# Patient Record
Sex: FEMALE | Race: WHITE | NOT HISPANIC OR LATINO | URBAN - METROPOLITAN AREA
[De-identification: names, ages, dates, MRNs, and addresses within clinical notes are randomized per-mention and may not be internally consistent; named-entity substitution may affect disease eponyms.]

---

## 2017-11-20 ENCOUNTER — EMERGENCY (EMERGENCY)
Facility: HOSPITAL | Age: 82
LOS: 0 days | Discharge: HOME | End: 2017-11-20

## 2017-11-20 DIAGNOSIS — R55 SYNCOPE AND COLLAPSE: ICD-10-CM
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
## 2017-11-20 DIAGNOSIS — M62.82 RHABDOMYOLYSIS: ICD-10-CM

## 2017-11-20 DIAGNOSIS — S82.409A UNSPECIFIED FRACTURE OF SHAFT OF UNSPECIFIED FIBULA, INITIAL ENCOUNTER FOR CLOSED FRACTURE: ICD-10-CM
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
## 2017-11-20 DIAGNOSIS — E86.0 DEHYDRATION: ICD-10-CM
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
## 2017-11-20 DIAGNOSIS — Z88.0 ALLERGY STATUS TO PENICILLIN: ICD-10-CM

## 2019-01-17 ENCOUNTER — INPATIENT (INPATIENT)
Facility: HOSPITAL | Age: 84
LOS: 4 days | Discharge: NURSING, REHAB OR LONG-TERM CARE | End: 2019-01-22
Attending: INTERNAL MEDICINE | Admitting: INTERNAL MEDICINE
Payer: MEDICARE

## 2019-01-17 VITALS
DIASTOLIC BLOOD PRESSURE: 87 MMHG | TEMPERATURE: 96 F | RESPIRATION RATE: 18 BRPM | OXYGEN SATURATION: 96 % | HEART RATE: 115 BPM | SYSTOLIC BLOOD PRESSURE: 141 MMHG

## 2019-01-17 DIAGNOSIS — Z98.890 OTHER SPECIFIED POSTPROCEDURAL STATES: Chronic | ICD-10-CM

## 2019-01-17 LAB
ALBUMIN SERPL ELPH-MCNC: 4.4 G/DL — SIGNIFICANT CHANGE UP (ref 3.5–5.2)
ALP SERPL-CCNC: 33 U/L — SIGNIFICANT CHANGE UP (ref 30–115)
ALT FLD-CCNC: 24 U/L — SIGNIFICANT CHANGE UP (ref 0–41)
ANION GAP SERPL CALC-SCNC: 14 MMOL/L — SIGNIFICANT CHANGE UP (ref 7–14)
APPEARANCE UR: ABNORMAL
APTT BLD: 23.6 SEC — CRITICAL LOW (ref 27–39.2)
AST SERPL-CCNC: 46 U/L — HIGH (ref 0–41)
BACTERIA # UR AUTO: SIGNIFICANT CHANGE UP /HPF
BASOPHILS # BLD AUTO: 0.02 K/UL — SIGNIFICANT CHANGE UP (ref 0–0.2)
BASOPHILS NFR BLD AUTO: 0.2 % — SIGNIFICANT CHANGE UP (ref 0–1)
BILIRUB SERPL-MCNC: 0.8 MG/DL — SIGNIFICANT CHANGE UP (ref 0.2–1.2)
BILIRUB UR-MCNC: ABNORMAL
BUN SERPL-MCNC: 59 MG/DL — HIGH (ref 10–20)
CALCIUM SERPL-MCNC: 10.4 MG/DL — HIGH (ref 8.5–10.1)
CHLORIDE SERPL-SCNC: 111 MMOL/L — HIGH (ref 98–110)
CO2 SERPL-SCNC: 29 MMOL/L — SIGNIFICANT CHANGE UP (ref 17–32)
COLOR SPEC: YELLOW — SIGNIFICANT CHANGE UP
COMMENT - URINE: SIGNIFICANT CHANGE UP
CREAT SERPL-MCNC: 1.1 MG/DL — SIGNIFICANT CHANGE UP (ref 0.7–1.5)
DIFF PNL FLD: ABNORMAL
EOSINOPHIL # BLD AUTO: 0 K/UL — SIGNIFICANT CHANGE UP (ref 0–0.7)
EOSINOPHIL NFR BLD AUTO: 0 % — SIGNIFICANT CHANGE UP (ref 0–8)
EPI CELLS # UR: ABNORMAL /HPF
GLUCOSE SERPL-MCNC: 162 MG/DL — HIGH (ref 70–99)
GLUCOSE UR QL: NEGATIVE MG/DL — SIGNIFICANT CHANGE UP
HCT VFR BLD CALC: 47 % — SIGNIFICANT CHANGE UP (ref 37–47)
HGB BLD-MCNC: 15.7 G/DL — SIGNIFICANT CHANGE UP (ref 12–16)
IMM GRANULOCYTES NFR BLD AUTO: 0.7 % — HIGH (ref 0.1–0.3)
INR BLD: 1.01 RATIO — SIGNIFICANT CHANGE UP (ref 0.65–1.3)
KETONES UR-MCNC: NEGATIVE — SIGNIFICANT CHANGE UP
LEUKOCYTE ESTERASE UR-ACNC: ABNORMAL
LYMPHOCYTES # BLD AUTO: 0.91 K/UL — LOW (ref 1.2–3.4)
LYMPHOCYTES # BLD AUTO: 6.9 % — LOW (ref 20.5–51.1)
MAGNESIUM SERPL-MCNC: 2.3 MG/DL — SIGNIFICANT CHANGE UP (ref 1.8–2.4)
MCHC RBC-ENTMCNC: 27.5 PG — SIGNIFICANT CHANGE UP (ref 27–31)
MCHC RBC-ENTMCNC: 33.4 G/DL — SIGNIFICANT CHANGE UP (ref 32–37)
MCV RBC AUTO: 82.5 FL — SIGNIFICANT CHANGE UP (ref 81–99)
MONOCYTES # BLD AUTO: 0.94 K/UL — HIGH (ref 0.1–0.6)
MONOCYTES NFR BLD AUTO: 7.1 % — SIGNIFICANT CHANGE UP (ref 1.7–9.3)
NEUTROPHILS # BLD AUTO: 11.32 K/UL — HIGH (ref 1.4–6.5)
NEUTROPHILS NFR BLD AUTO: 85.1 % — HIGH (ref 42.2–75.2)
NITRITE UR-MCNC: NEGATIVE — SIGNIFICANT CHANGE UP
NRBC # BLD: 0 /100 WBCS — SIGNIFICANT CHANGE UP (ref 0–0)
PH UR: 5.5 — SIGNIFICANT CHANGE UP (ref 5–8)
PLATELET # BLD AUTO: 284 K/UL — SIGNIFICANT CHANGE UP (ref 130–400)
POTASSIUM SERPL-MCNC: 4.2 MMOL/L — SIGNIFICANT CHANGE UP (ref 3.5–5)
POTASSIUM SERPL-SCNC: 4.2 MMOL/L — SIGNIFICANT CHANGE UP (ref 3.5–5)
PROT SERPL-MCNC: 7.6 G/DL — SIGNIFICANT CHANGE UP (ref 6–8)
PROT UR-MCNC: 30 MG/DL
PROTHROM AB SERPL-ACNC: 10.9 SEC — SIGNIFICANT CHANGE UP (ref 9.95–12.87)
RBC # BLD: 5.7 M/UL — HIGH (ref 4.2–5.4)
RBC # FLD: 13.4 % — SIGNIFICANT CHANGE UP (ref 11.5–14.5)
RBC CASTS # UR COMP ASSIST: SIGNIFICANT CHANGE UP /HPF
SODIUM SERPL-SCNC: 154 MMOL/L — HIGH (ref 135–146)
SP GR SPEC: 1.02 — SIGNIFICANT CHANGE UP (ref 1.01–1.03)
TROPONIN T SERPL-MCNC: <0.01 NG/ML — SIGNIFICANT CHANGE UP
UROBILINOGEN FLD QL: 1 MG/DL (ref 0.2–0.2)
WBC # BLD: 13.28 K/UL — HIGH (ref 4.8–10.8)
WBC # FLD AUTO: 13.28 K/UL — HIGH (ref 4.8–10.8)
WBC UR QL: ABNORMAL /HPF

## 2019-01-17 RX ORDER — INFLUENZA VIRUS VACCINE 15; 15; 15; 15 UG/.5ML; UG/.5ML; UG/.5ML; UG/.5ML
0.5 SUSPENSION INTRAMUSCULAR ONCE
Qty: 0 | Refills: 0 | Status: COMPLETED | OUTPATIENT
Start: 2019-01-17 | End: 2019-01-17

## 2019-01-17 RX ORDER — CHLORHEXIDINE GLUCONATE 213 G/1000ML
1 SOLUTION TOPICAL
Qty: 0 | Refills: 0 | Status: DISCONTINUED | OUTPATIENT
Start: 2019-01-17 | End: 2019-01-22

## 2019-01-17 RX ORDER — SODIUM CHLORIDE 9 MG/ML
1000 INJECTION, SOLUTION INTRAVENOUS
Qty: 0 | Refills: 0 | Status: DISCONTINUED | OUTPATIENT
Start: 2019-01-17 | End: 2019-01-19

## 2019-01-17 RX ORDER — SODIUM CHLORIDE 9 MG/ML
1000 INJECTION INTRAMUSCULAR; INTRAVENOUS; SUBCUTANEOUS ONCE
Qty: 0 | Refills: 0 | Status: COMPLETED | OUTPATIENT
Start: 2019-01-17 | End: 2019-01-17

## 2019-01-17 RX ORDER — HEPARIN SODIUM 5000 [USP'U]/ML
5000 INJECTION INTRAVENOUS; SUBCUTANEOUS EVERY 12 HOURS
Qty: 0 | Refills: 0 | Status: DISCONTINUED | OUTPATIENT
Start: 2019-01-17 | End: 2019-01-22

## 2019-01-17 RX ADMIN — SODIUM CHLORIDE 75 MILLILITER(S): 9 INJECTION, SOLUTION INTRAVENOUS at 22:03

## 2019-01-17 RX ADMIN — SODIUM CHLORIDE 33.33 MILLILITER(S): 9 INJECTION INTRAMUSCULAR; INTRAVENOUS; SUBCUTANEOUS at 17:00

## 2019-01-17 NOTE — ED PROVIDER NOTE - CARE PLAN
Principal Discharge DX:	Hypernatremia  Secondary Diagnosis:	Dizziness  Secondary Diagnosis:	Dehydration  Secondary Diagnosis:	Fall  Secondary Diagnosis:	Poor hygiene

## 2019-01-17 NOTE — H&P ADULT - NSHPPHYSICALEXAM_GEN_ALL_CORE
VITALS:  T(F): 96.5 (01-17-19 @ 17:06), Max: 96.5 (01-17-19 @ 17:06)  HR: 118 (01-17-19 @ 17:06) (115 - 118)  BP: 122/80 (01-17-19 @ 17:06) (122/80 - 141/87)  RR: 20 (01-17-19 @ 17:06) (18 - 20)  SpO2: 95% (01-17-19 @ 17:06) (95% - 96%)    PHYSICAL EXAM:  GENERAL: NAD, disheveled.  HEAD:  Atraumatic, Normocephalic  EYES: conjunctiva and sclera clear  ENMT: Moist mucous membranes  NECK: Supple, Normal thyroid  NERVOUS SYSTEM:  Alert & Oriented X 3, Good concentration; Motor Strength 5/5 B/L upper and lower extremities  CHEST/LUNG: Clear to auscultation bilaterally; No rales, rhonchi, wheezing, or rubs  HEART: Regular rate and rhythm; No murmurs, rubs, or gallops  ABDOMEN: Soft, Nontender, Nondistended; Bowel sounds present  EXTREMITIES:  2+ Peripheral Pulses, No clubbing, cyanosis, or edema  LYMPH: No lymphadenopathy noted  SKIN: No rashes or lesions

## 2019-01-17 NOTE — ED PROVIDER NOTE - MEDICAL DECISION MAKING DETAILS
BUN elevated with elevated Na.  Pt will need  for further evaluation of living situation. Case d/w Dr Rodriguez, accepts admisison.  Pt aware of plan

## 2019-01-17 NOTE — H&P ADULT - NSHPLABSRESULTS_GEN_ALL_CORE
15.7   13.28 )-----------( 284      ( 2019 14:40 )             47.0             154<H>  |  111<H>  |  59<H>  ----------------------------<  162<H>  4.2   |  29  |  1.1    Ca    10.4<H>      2019 14:40  Mg     2.3         TPro  7.6  /  Alb  4.4  /  TBili  0.8  /  DBili  x   /  AST  46<H>  /  ALT  24  /  AlkPhos  33          CT Head No Cont (19 @ 14:59)   1.  Prominent ventricles out of proportion to sulcal pattern in the   appropriate clinical setting consistent with communicating hydrocephalus.     2.  Periventricular and subcortical white matter chronic small vessel   ischemic changes.    3.  No acute mass effect, midline shift or hemorrhage.     4.  Slightly limited study due to patient motion and motion artifact.     5.  If the patient continues to be symptomatic follow-up MRI of the brain   may be helpful for further evaluation.        PT/INR - ( 2019 14:40 )   PT: 10.90 sec;   INR: 1.01 ratio    PTT - ( 2019 14:40 )  PTT:23.6 sec      Urinalysis Basic - ( 2019 14:40 )    Color: Yellow / Appearance: Turbid / S.025 / pH: x  Gluc: x / Ketone: Negative  / Bili: Moderate / Urobili: 1.0 mg/dL   Blood: x / Protein: 30 mg/dL / Nitrite: Negative   Leuk Esterase: Small / RBC: 1-2 /HPF / WBC 6-10 /HPF   Sq Epi: x / Non Sq Epi: Few /HPF / Bacteria: Occasional /HPF

## 2019-01-17 NOTE — ED PROVIDER NOTE - ATTENDING CONTRIBUTION TO CARE
84 yo F no significant PMHx (does not like to go to the Dr) presents from home with c/o feeling dizzy.  She fell today and called 911.  no CP, no SOB.  Pt lives alone.  Tony Dillard called after patient arrived ([phone]) to say that patient does not care for herself, does not go to the doctors and can be very stubborn.  On exam pt is AAo x 3, disheveled, wearing clothes that are too big, covered in stool.  thin, no obvious signs of trauma, dry MM, Op clear, Lungs CAT B/L no wrr, abd soft nt nd, , no edema

## 2019-01-17 NOTE — ED ADULT NURSE NOTE - NSIMPLEMENTINTERV_GEN_ALL_ED
Implemented All Universal Safety Interventions:  [place] to call system. Call bell, personal items and telephone within reach. Instruct patient to call for assistance. Room bathroom lighting operational. Non-slip footwear when patient is off stretcher. Physically safe environment: no spills, clutter or unnecessary equipment. Stretcher in lowest position, wheels locked, appropriate side rails in place.

## 2019-01-17 NOTE — ED PROVIDER NOTE - OBJECTIVE STATEMENT
82 y/o female presents with dizziness which began today. patient s/p fall but denies any headache, back pain or visual changes. patient denies any chest pain, abdominal pain , SOB. patient lives alone and is unable to preform ADLs including dressing, showering herself. patient presetns to the ed covered in fecal matter, and urine stained clothing.

## 2019-01-17 NOTE — H&P ADULT - HISTORY OF PRESENT ILLNESS
84 y/o female with no known past medical history admitted with complaints of dizziness. Patient mentions that she lives alone and has been in her usual state of health until yesterday when she started feeling dizzy and sustaining a fall. She remained down as she could not get up until she was able to call EMS by activating her life saver. She denied any LOC head trauma, chest pain, palpations, headache, focal weakness or systemic symptoms. Work up in the ED showed mild leukocytosis with hypernatremia but no injury. She is unable to preform ADLs including dressing, showering herself. On arrival she was covered in fecal matter and urine stained clothing.

## 2019-01-17 NOTE — ED ADULT NURSE NOTE - CHIEF COMPLAINT QUOTE
pt brought in via [facility], pt fell at home and hit her life alert, pt states she tripped

## 2019-01-17 NOTE — H&P ADULT - ASSESSMENT
84 y/o female with no known past medical history admitted with complaints of dizziness. Patient mentions that she lives alone and has been in her usual state of health until yesterday when she started feeling dizzy and sustaining a fall. She remained down as she could not get up until she was able to call EMS by activating her life saver. She denied any LOC head trauma, chest pain, palpations, headache, focal weakness or systemic symptoms. Work up in the ED showed mild leukocytosis with hypernatremia but no injury. She is unable to preform ADLs including dressing, showering herself. On arrival she was covered in fecal matter and urine stained clothing.      Assessment and Plan:    1. s/p Mechanical fall:  PT evaluation.  Check CPK level.      2. Debility:  Social work consult.  Will need Placement.      3. Hypernatremia:  Encourage PO intake.  IV fluids.      4. Hypercalcemia & Hypernatremia:  IV 0.45 NSS at 75 cc.  Monitor Levels.      5. Asymptomatic Pyuria:  No need to treat.  Monitor.      DVT prophylaxis: Heparin.

## 2019-01-18 LAB
ALBUMIN SERPL ELPH-MCNC: 3.3 G/DL — LOW (ref 3.5–5.2)
ALP SERPL-CCNC: 29 U/L — LOW (ref 30–115)
ALT FLD-CCNC: 20 U/L — SIGNIFICANT CHANGE UP (ref 0–41)
ANION GAP SERPL CALC-SCNC: 10 MMOL/L — SIGNIFICANT CHANGE UP (ref 7–14)
ANION GAP SERPL CALC-SCNC: 16 MMOL/L — HIGH (ref 7–14)
AST SERPL-CCNC: 39 U/L — SIGNIFICANT CHANGE UP (ref 0–41)
BASOPHILS # BLD AUTO: 0.03 K/UL — SIGNIFICANT CHANGE UP (ref 0–0.2)
BASOPHILS NFR BLD AUTO: 0.3 % — SIGNIFICANT CHANGE UP (ref 0–1)
BILIRUB SERPL-MCNC: 0.7 MG/DL — SIGNIFICANT CHANGE UP (ref 0.2–1.2)
BUN SERPL-MCNC: 50 MG/DL — HIGH (ref 10–20)
BUN SERPL-MCNC: 51 MG/DL — HIGH (ref 10–20)
CALCIUM SERPL-MCNC: 9 MG/DL — SIGNIFICANT CHANGE UP (ref 8.5–10.1)
CALCIUM SERPL-MCNC: 9.5 MG/DL — SIGNIFICANT CHANGE UP (ref 8.5–10.1)
CHLORIDE SERPL-SCNC: 104 MMOL/L — SIGNIFICANT CHANGE UP (ref 98–110)
CHLORIDE SERPL-SCNC: 110 MMOL/L — SIGNIFICANT CHANGE UP (ref 98–110)
CK SERPL-CCNC: 205 U/L — SIGNIFICANT CHANGE UP (ref 0–225)
CO2 SERPL-SCNC: 28 MMOL/L — SIGNIFICANT CHANGE UP (ref 17–32)
CO2 SERPL-SCNC: 28 MMOL/L — SIGNIFICANT CHANGE UP (ref 17–32)
CREAT SERPL-MCNC: 1.3 MG/DL — SIGNIFICANT CHANGE UP (ref 0.7–1.5)
CREAT SERPL-MCNC: 1.3 MG/DL — SIGNIFICANT CHANGE UP (ref 0.7–1.5)
EOSINOPHIL # BLD AUTO: 0.1 K/UL — SIGNIFICANT CHANGE UP (ref 0–0.7)
EOSINOPHIL NFR BLD AUTO: 0.9 % — SIGNIFICANT CHANGE UP (ref 0–8)
FOLATE SERPL-MCNC: 5.8 NG/ML — SIGNIFICANT CHANGE UP
GLUCOSE SERPL-MCNC: 109 MG/DL — HIGH (ref 70–99)
GLUCOSE SERPL-MCNC: 83 MG/DL — SIGNIFICANT CHANGE UP (ref 70–99)
HCT VFR BLD CALC: 37.4 % — SIGNIFICANT CHANGE UP (ref 37–47)
HGB BLD-MCNC: 12.8 G/DL — SIGNIFICANT CHANGE UP (ref 12–16)
IMM GRANULOCYTES NFR BLD AUTO: 0.9 % — HIGH (ref 0.1–0.3)
LYMPHOCYTES # BLD AUTO: 1.46 K/UL — SIGNIFICANT CHANGE UP (ref 1.2–3.4)
LYMPHOCYTES # BLD AUTO: 12.6 % — LOW (ref 20.5–51.1)
MAGNESIUM SERPL-MCNC: 1.9 MG/DL — SIGNIFICANT CHANGE UP (ref 1.8–2.4)
MCHC RBC-ENTMCNC: 28.3 PG — SIGNIFICANT CHANGE UP (ref 27–31)
MCHC RBC-ENTMCNC: 34.2 G/DL — SIGNIFICANT CHANGE UP (ref 32–37)
MCV RBC AUTO: 82.6 FL — SIGNIFICANT CHANGE UP (ref 81–99)
MONOCYTES # BLD AUTO: 1.04 K/UL — HIGH (ref 0.1–0.6)
MONOCYTES NFR BLD AUTO: 8.9 % — SIGNIFICANT CHANGE UP (ref 1.7–9.3)
NEUTROPHILS # BLD AUTO: 8.9 K/UL — HIGH (ref 1.4–6.5)
NEUTROPHILS NFR BLD AUTO: 76.4 % — HIGH (ref 42.2–75.2)
NRBC # BLD: 0 /100 WBCS — SIGNIFICANT CHANGE UP (ref 0–0)
PHOSPHATE SERPL-MCNC: 2.5 MG/DL — SIGNIFICANT CHANGE UP (ref 2.1–4.9)
PLATELET # BLD AUTO: 207 K/UL — SIGNIFICANT CHANGE UP (ref 130–400)
POTASSIUM SERPL-MCNC: 3.6 MMOL/L — SIGNIFICANT CHANGE UP (ref 3.5–5)
POTASSIUM SERPL-MCNC: 3.9 MMOL/L — SIGNIFICANT CHANGE UP (ref 3.5–5)
POTASSIUM SERPL-SCNC: 3.6 MMOL/L — SIGNIFICANT CHANGE UP (ref 3.5–5)
POTASSIUM SERPL-SCNC: 3.9 MMOL/L — SIGNIFICANT CHANGE UP (ref 3.5–5)
PROT SERPL-MCNC: 5.6 G/DL — LOW (ref 6–8)
RBC # BLD: 4.53 M/UL — SIGNIFICANT CHANGE UP (ref 4.2–5.4)
RBC # FLD: 13.4 % — SIGNIFICANT CHANGE UP (ref 11.5–14.5)
SODIUM SERPL-SCNC: 148 MMOL/L — HIGH (ref 135–146)
SODIUM SERPL-SCNC: 148 MMOL/L — HIGH (ref 135–146)
TSH SERPL-MCNC: 1.42 UIU/ML — SIGNIFICANT CHANGE UP (ref 0.27–4.2)
VIT B12 SERPL-MCNC: 788 PG/ML — SIGNIFICANT CHANGE UP (ref 232–1245)
WBC # BLD: 11.63 K/UL — HIGH (ref 4.8–10.8)
WBC # FLD AUTO: 11.63 K/UL — HIGH (ref 4.8–10.8)

## 2019-01-18 PROCEDURE — 99221 1ST HOSP IP/OBS SF/LOW 40: CPT

## 2019-01-18 RX ORDER — ACETAMINOPHEN 500 MG
650 TABLET ORAL EVERY 6 HOURS
Qty: 0 | Refills: 0 | Status: DISCONTINUED | OUTPATIENT
Start: 2019-01-18 | End: 2019-01-22

## 2019-01-18 RX ADMIN — CHLORHEXIDINE GLUCONATE 1 APPLICATION(S): 213 SOLUTION TOPICAL at 05:12

## 2019-01-18 RX ADMIN — HEPARIN SODIUM 5000 UNIT(S): 5000 INJECTION INTRAVENOUS; SUBCUTANEOUS at 18:08

## 2019-01-18 RX ADMIN — HEPARIN SODIUM 5000 UNIT(S): 5000 INJECTION INTRAVENOUS; SUBCUTANEOUS at 05:12

## 2019-01-18 NOTE — CONSULT NOTE ADULT - SUBJECTIVE AND OBJECTIVE BOX
S :   83y year old Female seen at bedside for diabetic risk foot assessment with a chief complaint of   painful thickened, dystrophic, ingrowing  and long toenails digits 1-5 bilaterally  and preventative foot examination. Patient is medically managed  by Medicine/Hospitalists.  Patient denies any history o f trauma to both feet.  Patient has no other pedal complaints.  Patient is experiencing pain while standing, walking and in shoe gear.       Patient admits to  (-) Fevers, (-) Chills, (-) Nausea, (-) Vomiting, (-) Shortness of Breath (-) calf pain (-) chest pain       PMH: No pertinent past medical history    PSH:H/O eye surgery      Allergies:No Known Allergies      Labs:                        12.8   11.63 )-----------( 207      ( 18 Jan 2019 08:48 )             37.4       WBC Trend  11.63<H> Date (01-18 @ 08:48)  13.28<H> Date (01-17 @ 14:40)      Chem  01-18    148<H>  |  110  |  50<H>  ----------------------------<  109<H>  3.9   |  28  |  1.3    Ca    9.0      18 Jan 2019 08:48  Phos  2.5     01-18  Mg     1.9     01-18    TPro  5.6<L>  /  Alb  3.3<L>  /  TBili  0.7  /  DBili  x   /  AST  39  /  ALT  20  /  AlkPhos  29<L>  01-18          T(F): 96.2 (01-18-19 @ 14:08), Max: 96.8 (01-17-19 @ 20:35)  HR: 111 (01-18-19 @ 14:08) (100 - 119)  BP: 95/58 (01-18-19 @ 14:08) (95/58 - 110/55)  RR: 16 (01-18-19 @ 14:08) (16 - 16)  SpO2: 97% (01-17-19 @ 21:50) (97% - 97%)  Wt(kg): --    O:   Integument:  Skin warm, dry and supple bilateral.  No open lesions or inter-digital macerations noted bilateral.   Toenails 1-5 Right and Left feet thickened, elongated, discolored, and dystrophic with subungual debris. There is pain upon palpation of all fungal and ingrowing nails 1-5 bilaterally.   Vascular: Dorsalis Pedis and Posterior Tibial pulses diminished .  Capillary re-fill time less than 3 seconds digits 1-5 bilateral.   Neuro: Protective sensation intact to the level of the digits bilateral.  MSK: Muscle strength 5/5 all major muscle groups bilateral. No structural abnormality, bilaterally      A:   1. bilateral hypertrohic Onychomycosis digits 1-5   2. Pain from Elongated nails, ingrowing  and dystrophic nails  P: Discussed diagnosis and treatment with patient  Aseptic debridement and curretage  of all fungal and ingrowing  nails 1-5 bilateral with sterile nail pack  Please re-consult as needed.

## 2019-01-18 NOTE — CONSULT NOTE ADULT - ATTENDING COMMENTS
agree with note  Patient would benefit from periodic debridement and foot care   Patient can follow up in clinic as out patient

## 2019-01-18 NOTE — PHYSICAL THERAPY INITIAL EVALUATION ADULT - GENERAL OBSERVATIONS, REHAB EVAL
10:35 - 10:58. Chart reviewed. Patient available to be seen for physical therapy, confirmed with nurse. Patient encountered semi-reclined in bed, +IV.  Patient denies pain at rest, would like to get OOB to chair.

## 2019-01-18 NOTE — CONSULT NOTE ADULT - SUBJECTIVE AND OBJECTIVE BOX
HPI:  82 y/o female with no known past medical history admitted with complaints of dizziness. Patient mentions that she lives alone and has been in her usual state of health until yesterday when she started feeling dizzy and sustaining a fall. She remained down as she could not get up until she was able to call EMS by activating her life saver. She denied any LOC head trauma, chest pain, palpations, headache, focal weakness or systemic symptoms. Work up in the ED showed mild leukocytosis with hypernatremia but no injury. She is unable to preform ADLs including dressing, showering herself. On arrival she was covered in fecal matter and urine stained clothing. (2019 18:43)    PTN  REFERRED TO ACUTE  REHAB  FOR  EVAL AND  TX   PAST MEDICAL & SURGICAL HISTORY:  No pertinent past medical history  H/O eye surgery      Hospital Course:    TODAY'S SUBJECTIVE & REVIEW OF SYMPTOMS:     Constitutional WNL   Cardio WNL   Resp WNL   GI WNL  Heme WNL  Endo WNL  Skin WNL  MSK WNL  Neuro WNL  Cognitive WNL  Psych WNL      MEDICATIONS  (STANDING):  chlorhexidine 4% Liquid 1 Application(s) Topical <User Schedule>  heparin  Injectable 5000 Unit(s) SubCutaneous every 12 hours  sodium chloride 0.45%. 1000 milliLiter(s) (75 mL/Hr) IV Continuous <Continuous>    MEDICATIONS  (PRN):      FAMILY HISTORY:  Family history of heart disease (Sibling)      Allergies    No Known Allergies    Intolerances        SOCIAL HISTORY:    [  ] Etoh  [  ] Smoking  [  ] Substance abuse     Home Environment:  [ x ] Home Alone  [  ] Lives with Family  [  ] Home Health Aid    Dwelling:  [  ] Apartment  [ x ] Private House  [  ] Adult Home  [  ] Skilled Nursing Facility      [  ] Short Term  [  ] Long Term  [  x] Stairs       Elevator [  ]    FUNCTIONAL STATUS PTA: (Check all that apply)  Ambulation: [ x  ]Independent    [  ] Dependent     [  ] Non-Ambulatory  Assistive Device: [  ] SA Cane  [  ]  Q Cane  [  ] Walker  [  ]  Wheelchair  ADL : [x  ] Independent  [  ]  Dependent       Vital Signs Last 24 Hrs  T(C): 35.5 (2019 05:10), Max: 36 (2019 20:35)  T(F): 95.9 (2019 05:10), Max: 96.8 (2019 20:35)  HR: 100 (2019 05:10) (100 - 119)  BP: 102/57 (2019 05:10) (102/57 - 141/87)  BP(mean): --  RR: 16 (2019 05:10) (16 - 20)  SpO2: 97% (2019 21:50) (95% - 97%)      PHYSICAL EXAM: Alert & Oriented X3  GENERAL: NAD, well-groomed, well-developed  HEAD:  Atraumatic, Normocephalic  EYES: EOMI, PERRLA, conjunctiva and sclera clear  NECK: Supple, No JVD, Normal thyroid  CHEST/LUNG: Clear to percussion bilaterally; No rales, rhonchi, wheezing, or rubs  HEART: Regular rate and rhythm; No murmurs, rubs, or gallops  ABDOMEN: Soft, Nontender, Nondistended; Bowel sounds present  EXTREMITIES:  2+ Peripheral Pulses, No clubbing, cyanosis, or edema    NERVOUS SYSTEM:  Cranial Nerves 2-12 intact [ x ] Abnormal  [  ]  ROM: WFL all extremities [  ]  Abnormal [ x ]  Motor Strength: WFL all extremities  [  ]  Abnormal [ x ]  Sensation: intact to light touch [  ] Abnormal [ x ]  Reflexes: Symmetric [  ]  Abnormal [x  ]    FUNCTIONAL STATUS:  Bed Mobility: Independent [  ]  Supervision [  ]  Needs Assistance [ x ]  N/A [  ]  Transfers: Independent [  ]  Supervision [  ]  Needs Assistance [  x]  N/A [  ]   Ambulation: Independent [  ]  Supervision [  ]  Needs Assistance [ x ]  N/A [  ]  ADL: Independent [x  ] Requires Assistance [  ] N/A [  ]  SEE PT/OT IE NOTES    LABS:                        12.8   11.63 )-----------( 207      ( 2019 08:48 )             37.4         148<H>  |  110  |  50<H>  ----------------------------<  109<H>  3.9   |  28  |  1.3    Ca    9.0      2019 08:48  Phos  2.5       Mg     1.9         TPro  5.6<L>  /  Alb  3.3<L>  /  TBili  0.7  /  DBili  x   /  AST  39  /  ALT  20  /  AlkPhos  29<L>      PT/INR - ( 2019 14:40 )   PT: 10.90 sec;   INR: 1.01 ratio         PTT - ( 2019 14:40 )  PTT:23.6 sec  Urinalysis Basic - ( 2019 14:40 )    Color: Yellow / Appearance: Turbid / S.025 / pH: x  Gluc: x / Ketone: Negative  / Bili: Moderate / Urobili: 1.0 mg/dL   Blood: x / Protein: 30 mg/dL / Nitrite: Negative   Leuk Esterase: Small / RBC: 1-2 /HPF / WBC 6-10 /HPF   Sq Epi: x / Non Sq Epi: Few /HPF / Bacteria: Occasional /HPF        RADIOLOGY & ADDITIONAL STUDIES:    Assesment:

## 2019-01-18 NOTE — PHYSICAL THERAPY INITIAL EVALUATION ADULT - IMPAIRMENTS FOUND, PT EVAL
muscle strength/aerobic capacity/endurance/posture/ergonomics and body mechanics/gait, locomotion, and balance

## 2019-01-18 NOTE — CONSULT NOTE ADULT - ASSESSMENT
IMPRESSION: Rehab of 82 y/o  f  rehab  for gd     PRECAUTIONS: [  ] Cardiac  [  ] Respiratory  [  ] Seizures [  ] Contact Isolation  [  ] Droplet Isolation  [ FALL ] Other    Weight Bearing Status:     RECOMMENDATION:    Out of Bed to Chair     DVT/Decubiti Prophylaxis    REHAB PLAN:     [ x  ] Bedside P/T 3-5 times a week   [   ]   Bedside O/T  2-3 times a week             [   ] No Rehab Therapy Indicated                   [   ]  Speech Therapy   Conditioning/ROM                                    ADL  Bed Mobility                                               Conditioning/ROM  Transfers                                                     Bed Mobility  Sitting /Standing Balance                         Transfers                                        Gait Training                                               Sitting/Standing Balance  Stair Training [   ]Applicable                    Home equipment Eval                                                                        Splinting  [   ] Only      GOALS:   ADL   [ x  ]   Independent                    Transfers  [  x] Independent                          Ambulation  [  x ] Independent     [    ] With device                            [   ]  CG                                                         [   ]  CG                                                                  [   ] CG                            [    ] Min A                                                   [   ] Min A                                                              [   ] Min  A          DISCHARGE PLAN:   [   ]  Good candidate for Intensive Rehabilitation/Hospital based-[facility]                                             Will tolerate 3hrs Intensive Rehab Daily                                       [ xx   ]  Short Term Rehab in Skilled Nursing Facility                                       [    ]  Home with Outpatient or  services                                         [    ]  Possible Candidate for Intensive Hospital based Rehab

## 2019-01-18 NOTE — PROGRESS NOTE ADULT - ASSESSMENT
84 y/o female with no known past medical history admitted with complaints of dizziness. Patient mentions that she lives alone and has been in her usual state of health until yesterday when she started feeling dizzy and sustaining a fall. She remained down as she could not get up until she was able to call EMS by activating her life saver. She denied any LOC head trauma, chest pain, palpations, headache, focal weakness or systemic symptoms. Work up in the ED showed mild leukocytosis with hypernatremia but no injury. She is unable to preform ADLs including dressing, showering herself. On arrival she was covered in fecal matter and urine stained clothing.      Assessment and Plan:    1. s/p Mechanical fall:  PT evaluation: STR in SNF.  CPK level pending.       2. Debility:  Social work consult.  Will need Placement.      3. Hypernatremia: Improved with Hydration.  Encourage PO intake.  Gentle IV fluids.      4. Hypercalcemia: Resolved  Monitor Levels.      5. Asymptomatic Pyuria:  No need to treat.  Monitor.      DVT prophylaxis: Heparin.  Disposition: SNF when stable.

## 2019-01-18 NOTE — PHYSICAL THERAPY INITIAL EVALUATION ADULT - GAIT DEVIATIONS NOTED, PT EVAL
decreased step length/narrow base of support/decreased weight-shifting ability/decreased meir/increased time in double stance

## 2019-01-19 DIAGNOSIS — E87.0 HYPEROSMOLALITY AND HYPERNATREMIA: ICD-10-CM

## 2019-01-19 DIAGNOSIS — R46.0 VERY LOW LEVEL OF PERSONAL HYGIENE: ICD-10-CM

## 2019-01-19 DIAGNOSIS — I95.9 HYPOTENSION, UNSPECIFIED: ICD-10-CM

## 2019-01-19 LAB
-  AMIKACIN: SIGNIFICANT CHANGE UP
-  AMOXICILLIN/CLAVULANIC ACID: SIGNIFICANT CHANGE UP
-  AMPICILLIN/SULBACTAM: SIGNIFICANT CHANGE UP
-  AMPICILLIN: SIGNIFICANT CHANGE UP
-  AZTREONAM: SIGNIFICANT CHANGE UP
-  CEFAZOLIN: SIGNIFICANT CHANGE UP
-  CEFEPIME: SIGNIFICANT CHANGE UP
-  CEFOXITIN: SIGNIFICANT CHANGE UP
-  CEFTRIAXONE: SIGNIFICANT CHANGE UP
-  CIPROFLOXACIN: SIGNIFICANT CHANGE UP
-  ERTAPENEM: SIGNIFICANT CHANGE UP
-  GENTAMICIN: SIGNIFICANT CHANGE UP
-  IMIPENEM: SIGNIFICANT CHANGE UP
-  LEVOFLOXACIN: SIGNIFICANT CHANGE UP
-  MEROPENEM: SIGNIFICANT CHANGE UP
-  NITROFURANTOIN: SIGNIFICANT CHANGE UP
-  PIPERACILLIN/TAZOBACTAM: SIGNIFICANT CHANGE UP
-  TIGECYCLINE: SIGNIFICANT CHANGE UP
-  TOBRAMYCIN: SIGNIFICANT CHANGE UP
-  TRIMETHOPRIM/SULFAMETHOXAZOLE: SIGNIFICANT CHANGE UP
ANION GAP SERPL CALC-SCNC: 7 MMOL/L — SIGNIFICANT CHANGE UP (ref 7–14)
BUN SERPL-MCNC: 39 MG/DL — HIGH (ref 10–20)
CALCIUM SERPL-MCNC: 8.3 MG/DL — LOW (ref 8.5–10.1)
CHLORIDE SERPL-SCNC: 105 MMOL/L — SIGNIFICANT CHANGE UP (ref 98–110)
CO2 SERPL-SCNC: 26 MMOL/L — SIGNIFICANT CHANGE UP (ref 17–32)
CREAT SERPL-MCNC: 1.1 MG/DL — SIGNIFICANT CHANGE UP (ref 0.7–1.5)
CULTURE RESULTS: SIGNIFICANT CHANGE UP
GLUCOSE SERPL-MCNC: 86 MG/DL — SIGNIFICANT CHANGE UP (ref 70–99)
METHOD TYPE: SIGNIFICANT CHANGE UP
ORGANISM # SPEC MICROSCOPIC CNT: SIGNIFICANT CHANGE UP
ORGANISM # SPEC MICROSCOPIC CNT: SIGNIFICANT CHANGE UP
POTASSIUM SERPL-MCNC: 3.9 MMOL/L — SIGNIFICANT CHANGE UP (ref 3.5–5)
POTASSIUM SERPL-SCNC: 3.9 MMOL/L — SIGNIFICANT CHANGE UP (ref 3.5–5)
SODIUM SERPL-SCNC: 138 MMOL/L — SIGNIFICANT CHANGE UP (ref 135–146)
SPECIMEN SOURCE: SIGNIFICANT CHANGE UP

## 2019-01-19 RX ORDER — SODIUM CHLORIDE 9 MG/ML
1000 INJECTION INTRAMUSCULAR; INTRAVENOUS; SUBCUTANEOUS
Qty: 0 | Refills: 0 | Status: DISCONTINUED | OUTPATIENT
Start: 2019-01-19 | End: 2019-01-20

## 2019-01-19 RX ORDER — CEFTRIAXONE 500 MG/1
INJECTION, POWDER, FOR SOLUTION INTRAMUSCULAR; INTRAVENOUS
Qty: 0 | Refills: 0 | Status: DISCONTINUED | OUTPATIENT
Start: 2019-01-19 | End: 2019-01-22

## 2019-01-19 RX ORDER — MIDODRINE HYDROCHLORIDE 2.5 MG/1
5 TABLET ORAL ONCE
Qty: 0 | Refills: 0 | Status: COMPLETED | OUTPATIENT
Start: 2019-01-19 | End: 2019-01-19

## 2019-01-19 RX ORDER — CEFTRIAXONE 500 MG/1
1 INJECTION, POWDER, FOR SOLUTION INTRAMUSCULAR; INTRAVENOUS ONCE
Qty: 0 | Refills: 0 | Status: COMPLETED | OUTPATIENT
Start: 2019-01-19 | End: 2019-01-19

## 2019-01-19 RX ORDER — SODIUM CHLORIDE 9 MG/ML
500 INJECTION INTRAMUSCULAR; INTRAVENOUS; SUBCUTANEOUS ONCE
Qty: 0 | Refills: 0 | Status: COMPLETED | OUTPATIENT
Start: 2019-01-19 | End: 2019-01-19

## 2019-01-19 RX ORDER — MIDODRINE HYDROCHLORIDE 2.5 MG/1
5 TABLET ORAL EVERY 8 HOURS
Qty: 0 | Refills: 0 | Status: DISCONTINUED | OUTPATIENT
Start: 2019-01-19 | End: 2019-01-22

## 2019-01-19 RX ORDER — CEFTRIAXONE 500 MG/1
1 INJECTION, POWDER, FOR SOLUTION INTRAMUSCULAR; INTRAVENOUS EVERY 24 HOURS
Qty: 0 | Refills: 0 | Status: DISCONTINUED | OUTPATIENT
Start: 2019-01-20 | End: 2019-01-22

## 2019-01-19 RX ADMIN — CEFTRIAXONE 100 GRAM(S): 500 INJECTION, POWDER, FOR SOLUTION INTRAMUSCULAR; INTRAVENOUS at 19:03

## 2019-01-19 RX ADMIN — SODIUM CHLORIDE 75 MILLILITER(S): 9 INJECTION INTRAMUSCULAR; INTRAVENOUS; SUBCUTANEOUS at 13:43

## 2019-01-19 RX ADMIN — SODIUM CHLORIDE 75 MILLILITER(S): 9 INJECTION INTRAMUSCULAR; INTRAVENOUS; SUBCUTANEOUS at 17:17

## 2019-01-19 RX ADMIN — SODIUM CHLORIDE 500 MILLILITER(S): 9 INJECTION INTRAMUSCULAR; INTRAVENOUS; SUBCUTANEOUS at 17:21

## 2019-01-19 RX ADMIN — HEPARIN SODIUM 5000 UNIT(S): 5000 INJECTION INTRAVENOUS; SUBCUTANEOUS at 17:23

## 2019-01-19 RX ADMIN — CHLORHEXIDINE GLUCONATE 1 APPLICATION(S): 213 SOLUTION TOPICAL at 05:20

## 2019-01-19 RX ADMIN — MIDODRINE HYDROCHLORIDE 5 MILLIGRAM(S): 2.5 TABLET ORAL at 21:25

## 2019-01-19 RX ADMIN — MIDODRINE HYDROCHLORIDE 5 MILLIGRAM(S): 2.5 TABLET ORAL at 13:44

## 2019-01-19 RX ADMIN — SODIUM CHLORIDE 75 MILLILITER(S): 9 INJECTION, SOLUTION INTRAVENOUS at 05:40

## 2019-01-19 RX ADMIN — MIDODRINE HYDROCHLORIDE 5 MILLIGRAM(S): 2.5 TABLET ORAL at 17:22

## 2019-01-19 RX ADMIN — SODIUM CHLORIDE 75 MILLILITER(S): 9 INJECTION, SOLUTION INTRAVENOUS at 11:24

## 2019-01-19 RX ADMIN — HEPARIN SODIUM 5000 UNIT(S): 5000 INJECTION INTRAVENOUS; SUBCUTANEOUS at 05:20

## 2019-01-19 NOTE — DIETITIAN INITIAL EVALUATION ADULT. - OTHER INFO
pt presents with dizziness s/p mechanical fall at home, ED work up showed mild leukocytosis with hypernatremia, no injurys noted. pt needs d/c planning to SNF/STR

## 2019-01-19 NOTE — PROGRESS NOTE ADULT - ASSESSMENT
84 y/o female with no known past medical history admitted with complaints of dizziness. Patient mentions that she lives alone and has been in her usual state of health until yesterday when she started feeling dizzy and sustaining a fall. She remained down as she could not get up until she was able to call EMS by activating her life saver. She denied any LOC head trauma, chest pain, palpations, headache, focal weakness or systemic symptoms. Work up in the ED showed mild leukocytosis with hypernatremia but no injury. She is unable to preform ADLs including dressing, showering herself. On arrival she was covered in fecal matter and urine stained clothing.      Assessment and Plan:    1. s/p Mechanical fall:  PT evaluation: STR in SNF.  CPK level wnl.      2. Debility:  Social work consult.  Will need Placement.      3. Hypernatremia: Resolved.  Encourage PO intake.        4. Hypercalcemia: Resolved  Monitor Levels.        5. Asymptomatic Pyuria:  No need to treat.  Monitor.      6. Hypotension:  Recheck BP for further management.        DVT prophylaxis: Heparin.  Disposition: SNF when stable. 82 y/o female with no known past medical history admitted with complaints of dizziness. Patient mentions that she lives alone and has been in her usual state of health until yesterday when she started feeling dizzy and sustaining a fall. She remained down as she could not get up until she was able to call EMS by activating her life saver. She denied any LOC head trauma, chest pain, palpations, headache, focal weakness or systemic symptoms. Work up in the ED showed mild leukocytosis with hypernatremia but no injury. She is unable to preform ADLs including dressing, showering herself. On arrival she was covered in fecal matter and urine stained clothing.      Assessment and Plan:    1. s/p Mechanical fall:  PT evaluation: STR in SNF.  CPK level wnl.      2. Debility:  Social work consult.  Will need Placement.      3. Hypernatremia: Resolved.  Encourage PO intake.        4. Hypercalcemia: Resolved  Monitor Levels.        5. E. Coli UTI:  Rocephin.      6. Hypotension: Asymptomatic.  IV fluids. Midodrine.        DVT prophylaxis: Heparin.  Disposition: SNF when stable.

## 2019-01-19 NOTE — PROGRESS NOTE ADULT - ASSESSMENT
From a geriatric perspective she needs urgent social service intervention - can go to SNF for RR but the question remains what will happen then?  I recommend contacting the niece re her willingness to supervise her aunt at home. A lot will depend upon the financial circumstances of the patient.  If she does go home after RR at SNF she would be an excellent candidate for [facility] Visit [facility] ([phone]) so that a medical team can follow her at home.    In the internum she needs to have her BP stabilized as well as further monitoring of serum Na.    Recommend she gets OOB to chair today.

## 2019-01-20 LAB
ANION GAP SERPL CALC-SCNC: 6 MMOL/L — LOW (ref 7–14)
BASOPHILS # BLD AUTO: 0.05 K/UL — SIGNIFICANT CHANGE UP (ref 0–0.2)
BASOPHILS NFR BLD AUTO: 0.5 % — SIGNIFICANT CHANGE UP (ref 0–1)
BUN SERPL-MCNC: 32 MG/DL — HIGH (ref 10–20)
CALCIUM SERPL-MCNC: 8.2 MG/DL — LOW (ref 8.5–10.1)
CHLORIDE SERPL-SCNC: 101 MMOL/L — SIGNIFICANT CHANGE UP (ref 98–110)
CO2 SERPL-SCNC: 25 MMOL/L — SIGNIFICANT CHANGE UP (ref 17–32)
CREAT SERPL-MCNC: 1.1 MG/DL — SIGNIFICANT CHANGE UP (ref 0.7–1.5)
EOSINOPHIL # BLD AUTO: 0.31 K/UL — SIGNIFICANT CHANGE UP (ref 0–0.7)
EOSINOPHIL NFR BLD AUTO: 3.1 % — SIGNIFICANT CHANGE UP (ref 0–8)
GLUCOSE SERPL-MCNC: 97 MG/DL — SIGNIFICANT CHANGE UP (ref 70–99)
HCT VFR BLD CALC: 30.2 % — LOW (ref 37–47)
HGB BLD-MCNC: 10.4 G/DL — LOW (ref 12–16)
IMM GRANULOCYTES NFR BLD AUTO: 3.8 % — HIGH (ref 0.1–0.3)
LYMPHOCYTES # BLD AUTO: 1.48 K/UL — SIGNIFICANT CHANGE UP (ref 1.2–3.4)
LYMPHOCYTES # BLD AUTO: 15 % — LOW (ref 20.5–51.1)
MCHC RBC-ENTMCNC: 28.3 PG — SIGNIFICANT CHANGE UP (ref 27–31)
MCHC RBC-ENTMCNC: 34.4 G/DL — SIGNIFICANT CHANGE UP (ref 32–37)
MCV RBC AUTO: 82.3 FL — SIGNIFICANT CHANGE UP (ref 81–99)
MONOCYTES # BLD AUTO: 1.08 K/UL — HIGH (ref 0.1–0.6)
MONOCYTES NFR BLD AUTO: 10.9 % — HIGH (ref 1.7–9.3)
NEUTROPHILS # BLD AUTO: 6.58 K/UL — HIGH (ref 1.4–6.5)
NEUTROPHILS NFR BLD AUTO: 66.7 % — SIGNIFICANT CHANGE UP (ref 42.2–75.2)
NRBC # BLD: 0 /100 WBCS — SIGNIFICANT CHANGE UP (ref 0–0)
PLATELET # BLD AUTO: 156 K/UL — SIGNIFICANT CHANGE UP (ref 130–400)
POTASSIUM SERPL-MCNC: 4.1 MMOL/L — SIGNIFICANT CHANGE UP (ref 3.5–5)
POTASSIUM SERPL-SCNC: 4.1 MMOL/L — SIGNIFICANT CHANGE UP (ref 3.5–5)
RBC # BLD: 3.67 M/UL — LOW (ref 4.2–5.4)
RBC # FLD: 13.7 % — SIGNIFICANT CHANGE UP (ref 11.5–14.5)
SODIUM SERPL-SCNC: 132 MMOL/L — LOW (ref 135–146)
WBC # BLD: 9.88 K/UL — SIGNIFICANT CHANGE UP (ref 4.8–10.8)
WBC # FLD AUTO: 9.88 K/UL — SIGNIFICANT CHANGE UP (ref 4.8–10.8)

## 2019-01-20 RX ADMIN — CEFTRIAXONE 100 GRAM(S): 500 INJECTION, POWDER, FOR SOLUTION INTRAMUSCULAR; INTRAVENOUS at 15:59

## 2019-01-20 RX ADMIN — Medication 650 MILLIGRAM(S): at 21:14

## 2019-01-20 RX ADMIN — MIDODRINE HYDROCHLORIDE 5 MILLIGRAM(S): 2.5 TABLET ORAL at 21:14

## 2019-01-20 RX ADMIN — HEPARIN SODIUM 5000 UNIT(S): 5000 INJECTION INTRAVENOUS; SUBCUTANEOUS at 17:06

## 2019-01-20 RX ADMIN — MIDODRINE HYDROCHLORIDE 5 MILLIGRAM(S): 2.5 TABLET ORAL at 05:44

## 2019-01-20 RX ADMIN — HEPARIN SODIUM 5000 UNIT(S): 5000 INJECTION INTRAVENOUS; SUBCUTANEOUS at 05:45

## 2019-01-20 RX ADMIN — MIDODRINE HYDROCHLORIDE 5 MILLIGRAM(S): 2.5 TABLET ORAL at 13:53

## 2019-01-20 NOTE — PROGRESS NOTE ADULT - ASSESSMENT
84 y/o female with no known past medical history admitted with complaints of dizziness. Patient mentions that she lives alone and has been in her usual state of health until yesterday when she started feeling dizzy and sustaining a fall. She remained down as she could not get up until she was able to call EMS by activating her life saver. She denied any LOC head trauma, chest pain, palpations, headache, focal weakness or systemic symptoms. Work up in the ED showed mild leukocytosis with hypernatremia but no injury. She is unable to preform ADLs including dressing, showering herself. On arrival she was covered in fecal matter and urine stained clothing.      Assessment and Plan:    1. s/p Mechanical fall:  PT evaluation: STR in SNF.  CPK level wnl.      2. Debility:  STR in SNF recommended.      3. Hyponatremia: Mild.  Off Hypotonic Fluids.        4. Hypercalcemia: Resolved  Monitor Levels.        5. E. Coli UTI:  Rocephin.      6. Hypotension: Resolved.  Asymptomatic.  Continue Midodrine for now.        DVT prophylaxis: Heparin.  Disposition: SNF when stable.

## 2019-01-21 RX ADMIN — MIDODRINE HYDROCHLORIDE 5 MILLIGRAM(S): 2.5 TABLET ORAL at 05:05

## 2019-01-21 RX ADMIN — Medication 650 MILLIGRAM(S): at 00:04

## 2019-01-21 RX ADMIN — HEPARIN SODIUM 5000 UNIT(S): 5000 INJECTION INTRAVENOUS; SUBCUTANEOUS at 17:01

## 2019-01-21 RX ADMIN — CHLORHEXIDINE GLUCONATE 1 APPLICATION(S): 213 SOLUTION TOPICAL at 05:06

## 2019-01-21 RX ADMIN — MIDODRINE HYDROCHLORIDE 5 MILLIGRAM(S): 2.5 TABLET ORAL at 14:26

## 2019-01-21 RX ADMIN — HEPARIN SODIUM 5000 UNIT(S): 5000 INJECTION INTRAVENOUS; SUBCUTANEOUS at 05:06

## 2019-01-21 RX ADMIN — CEFTRIAXONE 100 GRAM(S): 500 INJECTION, POWDER, FOR SOLUTION INTRAMUSCULAR; INTRAVENOUS at 15:36

## 2019-01-21 NOTE — PROGRESS NOTE ADULT - ASSESSMENT
84 y/o female with no known past medical history admitted with complaints of dizziness. Patient mentions that she lives alone and has been in her usual state of health until yesterday when she started feeling dizzy and sustaining a fall.   1. s/p Mechanical fall:  PT evaluation: STR in SNF.  2. Debility:  STR in SNF recommended.  3. Hyponatremia: Mild.  Off Hypotonic Fluids.  4. Hypercalcemia: Resolved  DVT, GI prophylaxis  Disposition Planning : May need STR  Pager No.  [phone]

## 2019-01-22 VITALS — TEMPERATURE: 98 F | HEART RATE: 94 BPM | DIASTOLIC BLOOD PRESSURE: 54 MMHG | SYSTOLIC BLOOD PRESSURE: 108 MMHG

## 2019-01-22 LAB
ANION GAP SERPL CALC-SCNC: 10 MMOL/L — SIGNIFICANT CHANGE UP (ref 7–14)
BUN SERPL-MCNC: 29 MG/DL — HIGH (ref 10–20)
CALCIUM SERPL-MCNC: 8.7 MG/DL — SIGNIFICANT CHANGE UP (ref 8.5–10.1)
CHLORIDE SERPL-SCNC: 98 MMOL/L — SIGNIFICANT CHANGE UP (ref 98–110)
CO2 SERPL-SCNC: 29 MMOL/L — SIGNIFICANT CHANGE UP (ref 17–32)
CREAT SERPL-MCNC: 1 MG/DL — SIGNIFICANT CHANGE UP (ref 0.7–1.5)
GLUCOSE SERPL-MCNC: 104 MG/DL — HIGH (ref 70–99)
POTASSIUM SERPL-MCNC: 4.6 MMOL/L — SIGNIFICANT CHANGE UP (ref 3.5–5)
POTASSIUM SERPL-SCNC: 4.6 MMOL/L — SIGNIFICANT CHANGE UP (ref 3.5–5)
SODIUM SERPL-SCNC: 137 MMOL/L — SIGNIFICANT CHANGE UP (ref 135–146)

## 2019-01-22 RX ORDER — MIDODRINE HYDROCHLORIDE 2.5 MG/1
1 TABLET ORAL
Qty: 0 | Refills: 0 | COMMUNITY
Start: 2019-01-22

## 2019-01-22 RX ADMIN — CEFTRIAXONE 100 GRAM(S): 500 INJECTION, POWDER, FOR SOLUTION INTRAMUSCULAR; INTRAVENOUS at 17:38

## 2019-01-22 RX ADMIN — MIDODRINE HYDROCHLORIDE 5 MILLIGRAM(S): 2.5 TABLET ORAL at 06:07

## 2019-01-22 RX ADMIN — CHLORHEXIDINE GLUCONATE 1 APPLICATION(S): 213 SOLUTION TOPICAL at 06:06

## 2019-01-22 RX ADMIN — MIDODRINE HYDROCHLORIDE 5 MILLIGRAM(S): 2.5 TABLET ORAL at 13:12

## 2019-01-22 RX ADMIN — HEPARIN SODIUM 5000 UNIT(S): 5000 INJECTION INTRAVENOUS; SUBCUTANEOUS at 06:08

## 2019-01-22 NOTE — PROGRESS NOTE ADULT - ASSESSMENT
From a geriatric perspective she needs urgent social service intervention - can go to SNF for RR but the question remains what will happen then?  I recommend contacting the niece re her willingness to supervise her aunt at home. A lot will depend upon the financial circumstances of the patient.  If she does go home after RR at SNF she would be an excellent candidate for [facility] Home Visit [facility] ([phone]) so that a medical team can follow her at home.    # Hypernatremia   - Resolved     # Hypotension  - Monitor the blood pressure   - Midodrine     # UTI   - She has no urinary symptoms   - Finish antibiotics course     # Dispo  - Pt needs Nursing home placement. Pt is not willing to go to nursing facility and interested in home rehab. In that case home program will follow up and set up home visit in one week.

## 2019-01-22 NOTE — DISCHARGE NOTE ADULT - CARE PLAN
Principal Discharge DX:	Fall  Goal:	prevent recurrence  Assessment and plan of treatment:	pt/rehab  Secondary Diagnosis:	Dehydration  Assessment and plan of treatment:	maintain good oral intake  Secondary Diagnosis:	Hypernatremia  Assessment and plan of treatment:	maintain good oral intake  Secondary Diagnosis:	Hypotension, unspecified hypotension type  Assessment and plan of treatment:	continue midodrine

## 2019-01-22 NOTE — DISCHARGE NOTE ADULT - HOSPITAL COURSE
82 y/o female with no known past medical history admitted with complaints of dizziness. Patient mentions that she lives alone and has been in her usual state of health until yesterday when she started feeling dizzy and sustaining a fall. She remained down as she could not get up until she was able to call EMS by activating her life saver. She denied any LOC head trauma, chest pain, palpations, headache, focal weakness or systemic symptoms. Work up in the ED showed mild leukocytosis with hypernatremia but no injury. She is unable to preform ADLs including dressing, showering herself. On arrival she was covered in fecal matter and urine stained clothing.      Assessment and Plan:    1. s/p Mechanical fall:  PT evaluation: STR in SNF.  CPK level wnl.      2. Debility:  STR in SNF recommended.      3. Hyponatremia: Mild.  resolved  Off Hypotonic Fluids.        4. Hypercalcemia: Resolved  Monitor Levels.        5. E. Coli UTI:  Rocephin IV - d/c today    6. Hypotension: Resolved.  Asymptomatic.  Continue Midodrine for now.        DVT prophylaxis: Heparin.  Disposition: SNF today  d/c planning took over 55 min

## 2019-01-22 NOTE — DISCHARGE NOTE ADULT - MEDICATION SUMMARY - MEDICATIONS TO TAKE
I will START or STAY ON the medications listed below when I get home from the hospital:    midodrine 5 mg oral tablet  -- 1 tab(s) by mouth every 8 hours  -- Indication: For Hypotension, unspecified hypotension type

## 2019-01-22 NOTE — PROGRESS NOTE ADULT - ASSESSMENT
82 y/o female with no known past medical history admitted with complaints of dizziness. Patient mentions that she lives alone and has been in her usual state of health until yesterday when she started feeling dizzy and sustaining a fall. She remained down as she could not get up until she was able to call EMS by activating her life saver. She denied any LOC head trauma, chest pain, palpations, headache, focal weakness or systemic symptoms. Work up in the ED showed mild leukocytosis with hypernatremia but no injury. She is unable to preform ADLs including dressing, showering herself. On arrival she was covered in fecal matter and urine stained clothing.      Assessment and Plan:    1. s/p Mechanical fall:  PT evaluation: STR in SNF.  CPK level wnl.      2. Debility:  STR in SNF recommended.      3. Hyponatremia: Mild.  resolved  Off Hypotonic Fluids.        4. Hypercalcemia: Resolved  Monitor Levels.        5. E. Coli UTI:  Rocephin IV - d/c in am    6. Hypotension: Resolved.  Asymptomatic.  Continue Midodrine for now.        DVT prophylaxis: Heparin.  Disposition: SNF when stable.

## 2019-01-23 ENCOUNTER — OUTPATIENT (OUTPATIENT)
Dept: OUTPATIENT SERVICES | Facility: HOSPITAL | Age: 84
LOS: 1 days | Discharge: HOME | End: 2019-01-23

## 2019-01-23 DIAGNOSIS — N39.0 URINARY TRACT INFECTION, SITE NOT SPECIFIED: ICD-10-CM

## 2019-01-23 DIAGNOSIS — Z98.890 OTHER SPECIFIED POSTPROCEDURAL STATES: Chronic | ICD-10-CM

## 2019-01-23 DIAGNOSIS — E87.0 HYPEROSMOLALITY AND HYPERNATREMIA: ICD-10-CM

## 2019-01-25 DIAGNOSIS — N39.0 URINARY TRACT INFECTION, SITE NOT SPECIFIED: ICD-10-CM

## 2019-01-25 DIAGNOSIS — I95.9 HYPOTENSION, UNSPECIFIED: ICD-10-CM

## 2019-01-25 DIAGNOSIS — E86.0 DEHYDRATION: ICD-10-CM

## 2019-01-25 DIAGNOSIS — B96.20 UNSPECIFIED ESCHERICHIA COLI [E. COLI] AS THE CAUSE OF DISEASES CLASSIFIED ELSEWHERE: ICD-10-CM

## 2019-01-25 DIAGNOSIS — R42 DIZZINESS AND GIDDINESS: ICD-10-CM

## 2019-01-25 DIAGNOSIS — R46.0 VERY LOW LEVEL OF PERSONAL HYGIENE: ICD-10-CM

## 2019-01-25 DIAGNOSIS — R53.81 OTHER MALAISE: ICD-10-CM

## 2019-01-25 DIAGNOSIS — B35.1 TINEA UNGUIUM: ICD-10-CM

## 2019-01-25 DIAGNOSIS — E87.0 HYPEROSMOLALITY AND HYPERNATREMIA: ICD-10-CM

## 2019-05-21 ENCOUNTER — OUTPATIENT (OUTPATIENT)
Dept: OUTPATIENT SERVICES | Facility: HOSPITAL | Age: 84
LOS: 1 days | Discharge: HOME | End: 2019-05-21

## 2019-05-21 DIAGNOSIS — E53.8 DEFICIENCY OF OTHER SPECIFIED B GROUP VITAMINS: ICD-10-CM

## 2019-05-21 DIAGNOSIS — N18.2 CHRONIC KIDNEY DISEASE, STAGE 2 (MILD): ICD-10-CM

## 2019-05-21 DIAGNOSIS — E13.9 OTHER SPECIFIED DIABETES MELLITUS WITHOUT COMPLICATIONS: ICD-10-CM

## 2019-05-21 DIAGNOSIS — R73.9 HYPERGLYCEMIA, UNSPECIFIED: ICD-10-CM

## 2019-05-21 DIAGNOSIS — Z98.890 OTHER SPECIFIED POSTPROCEDURAL STATES: Chronic | ICD-10-CM

## 2019-05-21 DIAGNOSIS — Z79.899 OTHER LONG TERM (CURRENT) DRUG THERAPY: ICD-10-CM

## 2022-05-17 ENCOUNTER — EMERGENCY (EMERGENCY)
Facility: HOSPITAL | Age: 87
LOS: 0 days | Discharge: HOME | End: 2022-05-18
Attending: EMERGENCY MEDICINE | Admitting: EMERGENCY MEDICINE
Payer: OTHER MISCELLANEOUS

## 2022-05-17 VITALS
RESPIRATION RATE: 18 BRPM | WEIGHT: 89.95 LBS | HEIGHT: 64 IN | HEART RATE: 94 BPM | SYSTOLIC BLOOD PRESSURE: 100 MMHG | TEMPERATURE: 100 F | DIASTOLIC BLOOD PRESSURE: 60 MMHG | OXYGEN SATURATION: 95 %

## 2022-05-17 DIAGNOSIS — F03.90 UNSPECIFIED DEMENTIA WITHOUT BEHAVIORAL DISTURBANCE: ICD-10-CM

## 2022-05-17 DIAGNOSIS — Z98.890 OTHER SPECIFIED POSTPROCEDURAL STATES: Chronic | ICD-10-CM

## 2022-05-17 DIAGNOSIS — C50.911 MALIGNANT NEOPLASM OF UNSPECIFIED SITE OF RIGHT FEMALE BREAST: ICD-10-CM

## 2022-05-17 PROCEDURE — 99283 EMERGENCY DEPT VISIT LOW MDM: CPT | Mod: FS

## 2022-05-17 RX ORDER — TRANEXAMIC ACID 100 MG/ML
5 INJECTION, SOLUTION INTRAVENOUS ONCE
Refills: 0 | Status: COMPLETED | OUTPATIENT
Start: 2022-05-17 | End: 2022-05-17

## 2022-05-17 RX ADMIN — TRANEXAMIC ACID 5 MILLILITER(S): 100 INJECTION, SOLUTION INTRAVENOUS at 23:26

## 2022-05-18 VITALS
HEART RATE: 89 BPM | DIASTOLIC BLOOD PRESSURE: 62 MMHG | TEMPERATURE: 100 F | RESPIRATION RATE: 18 BRPM | OXYGEN SATURATION: 96 % | SYSTOLIC BLOOD PRESSURE: 101 MMHG

## 2022-05-18 NOTE — ED PROVIDER NOTE - PATIENT PORTAL LINK FT
You can access the FollowMyHealth Patient Portal offered by [facility] by registering at the following website: [email or web address]. By joining [facility]’s FollowMyHealth portal, you will also be able to view your health information using other applications (apps) compatible with our system.

## 2022-05-18 NOTE — ED PROVIDER NOTE - OBJECTIVE STATEMENT
86 year old female past medical history of dementia and breast ca on hospice comes to emergency room for bleeding tumor on right breast. patient sent in by [facility] as tumor still bleeding despite pressure dressing

## 2022-05-18 NOTE — ED PROVIDER NOTE - CLINICAL SUMMARY MEDICAL DECISION MAKING FREE TEXT BOX
86-year-old female on hospice known breast mass presents with bleeding from mass despite manual pressure and nursing home.  In ED no improvement with TXA manual pressure.  Bleeding cauterized with silver nitrate Gelfoam and pressure dressing with resolution of bleeding patient returned back to nursing home well-appearing condition

## 2022-05-18 NOTE — ED PROVIDER NOTE - NS ED ATTENDING STATEMENT MOD
This was a shared visit with the SOFIYA. I reviewed and verified the documentation and independently performed the documented:

## 2022-05-18 NOTE — ED PROVIDER NOTE - NSFOLLOWUPINSTRUCTIONS_ED_ALL_ED_FT
Keep Gelfoam dressing dry and intact for 48 hours. Then you may get it wet and change outer dressing. DO NOT REMOVE GEL FOAM or your laceration may rebleed. Gelfoam will mesh into a scab and slough off as it heals. Follow up with your private doctor in 2-3 days for wound check.    Return to emergency room for Fever, redness, puss, or any other concern.     Uncontrolled Wound Bleeding      Uncontrolled bleeding can result from many causes. It can happen any time, especially for people who have an increased risk of bleeding. Uncontrolled bleeding can cause you to become confused or unconscious. It can also lead to shock or even be life-threatening. Call emergency services (911 in the [place] at the first sign of uncontrolled bleeding. Signs of uncontrolled bleeding include:  •Bleeding that does not stop even after applying direct pressure or using other techniques to stop it.      •Bleeding that spurts from a wound.       •Bleeding that pools in and around a wound and causes increased swelling.      •Bleeding that soaks through a dressing or clothing despite measures to stop it.      If you are at risk for uncontrolled bleeding, you should take precautions to protect yourself from cuts and other injuries that can cause bleeding. You must also know how to stop bleeding if it occurs.      Do I have an increased risk for uncontrolled bleeding?    You may be at greater risk for uncontrolled bleeding if:  •You have had a recent bleeding injury treated at an urgent care center or emergency room, especially if the injury is in an area of the body with a lot of blood vessels or large blood vessels.      •You have a bleeding disorder, such as hemophilia or von Willebrand disease.      •You are on a blood-thinning medicine (anticoagulant), such as warfarin.      •You take medicines such as aspirin and ibuprofen. These medicines can also thin your blood.      •You are on chemotherapy. Many chemotherapy medicines can decrease your body's normal blood-clotting ability.      •You have liver or kidney disease.        How to prevent bleeding    Take these precautions to help prevent bleeding:  •Be very careful when using knives, scissors, or other sharp objects.       •Use an electric razor instead of a blade.       •Do not use toothpicks.      •Use a soft-bristled toothbrush. Brush your teeth gently.       •Always wear shoes outdoors and wear slippers indoors.       •Be careful when cutting your fingernails and toenails.       •Place bath mats in the bathroom. If possible, install handrails as well.       •Wear gloves while you do yard work.       •Wear your seat belt.       •Prevent falls by removing loose rugs and extension cords from areas where you walk. Use a cane or walker if you need it.     •Avoid constipation by:   •Drinking enough fluid to keep your urine pale yellow.      •Eating foods that are high in fiber, such as beans, whole grains, and fresh fruits and vegetables.      •Limiting foods that are high in fat and processed sugars, such as fried or sweet foods.        •Do not play contact sports or participate in other activities that have a high risk for injury.        How to stop bleeding     If you are at risk for uncontrolled bleeding, ask your health care provider to help you assemble a first aid kit to control bleeding. Learn to use the supplies in your kit, and keep it handy at all times.    If someone is available to help when you have uncontrolled bleeding, ask the person to assist you and stay with you until emergency services arrive.    Follow these steps to stop bleedin.Find a safe place where you can remain still and calm. Lie down if possible.      2.Find the source of the bleeding.      3.Remove clothing over the wound to expose the area.      4.If possible, position yourself so that the body part with the wound is raised (elevated) above the level of your heart.      5.If you have a first aid kit, place a gauze pad from the kit over the bleeding area. If you do not have a kit, use any clean towel or cloth.    6.Put hard pressure over the wound. The smaller the wound, the smaller the area of pressure should be. Using the smallest surface possible generates more force directly onto the wound and is more efficient at controlling the bleeding. Maintain pressure until help arrives.  •If the wound is small, use your fingers to maintain direct pressure only on the wound as opposed to using your entire palm.      •If the wound is larger, use a larger surface area to apply pressure. This may involve using one or both of your palms.      •If the wound is large and gaping, wipe away any pooled blood and pack the wound with packing gauze from your bleeding kit or with a clean towel or cloth. Put pressure over the packed wound with both hands until emergency services arrive.      •For severe uncontrolled bleeding from an arm or leg, apply a tourniquet from your bleeding kit about 2–3 inches above the wound. Tighten the tourniquet until bleeding stops. Secure the tourniquet, and write down the time you placed it. You may still place pressure over the wound.          Follow these instructions at home:    •Take over-the-counter and prescription medicines only as told by your health care provider.    •If you are taking blood thinners:  •Talk with your health care provider before you take any medicines that contain aspirin or NSAIDs. These medicines increase your risk for dangerous bleeding.       •Take your medicine exactly as told, at the same time every day.       •Wear a medical alert bracelet or carry a card that lists what medicines you take.        •Keep all follow-up visits as told by your health care provider. This is important.        Contact a health care provider if you:    •Have menstrual bleeding that is heavier than normal.      •Have bloody or brown urine.      •Have easy bruising.      •Have stool that is black, tarry, bright red, or maroon.      •Vomit material that is dark brown, black, or red.      •Feel weak or dizzy.        Get help right away if you:    •Have bleeding that does not stop despite applying first aid measures.      •Have sudden, severe bleeding.      •Have chest pain.      •Have shortness of breath.      •Faint.        Summary    •Call emergency services (911 in the [place] at the first sign of uncontrolled bleeding.      •Signs of uncontrolled bleeding include bleeding that will not stop or bleeding that spurts, pools, or soaks through a dressing.      •Follow the steps to help stop bleeding until help arrives.      •If someone is available to help when you have uncontrolled bleeding, ask the person to assist you and stay with you until emergency services arrive.      This information is not intended to replace advice given to you by your health care provider. Make sure you discuss any questions you have with your health care provider.

## 2022-05-18 NOTE — ED PROVIDER NOTE - PHYSICAL EXAMINATION
Physical Exam    Vital Signs: I have reviewed the initial vital signs.  Constitutional: chornically ill appearing   Eyes: Conjunctiva pink, Sclera clear, PERRLA, EOMI.  Cardiovascular: S1 and S2, regular rate, regular rhythm, well-perfused extremities, radial pulses equal and 2+  Respiratory: unlabored respiratory effort, clear to auscultation bilaterally no wheezing, rales and rhonchi  Gastrointestinal: soft, non-tender abdomen, no pulsatile mass, normal bowl sounds  Musculoskeletal: supple neck, no lower extremity edema, no midline tenderness  Integumentary: warm, dry, + large breast tumor right side with pressure dressing in place, + bleeding slow ooze from tumor.   Neurologic: awake, alert,

## 2022-05-18 NOTE — ED PROVIDER NOTE - NSICDXPASTMEDICALHX_GEN_ALL_CORE_FT
PAST MEDICAL HISTORY:  No pertinent past medical history      PAST MEDICAL HISTORY:  Breast cancer     Dementia     No pertinent past medical history

## 2023-01-21 NOTE — PROGRESS NOTE ADULT - SUBJECTIVE AND OBJECTIVE BOX
83 year old white female admitted 1/17/19 after feeling dizzy at home and falling; she did not hit her head; she hit a life alert-type button and EMS came; she lives alone and "manages to do what I have to do"; she did not have any falls prior to her event leading to her admission; she is alert and oriented; she has a neice who checks up on her but no other family    In ED found to be covered in fecal matter and urine, very unkempt; albeit her clear conversation this morning the way she came in suggests an inability to care for herself independently; CT brain without aute changes (does have white matter changes and hydrocephalus); has asymptomatic pyuria    Admission mild leukocytosis is improving (15 to 11) as is hypernatremia (154 to 148thus far)  However he BP remains low, today at 86/47    She was admitted for being post fall.  She was seen by rehab Dr. Fox who feels she is best suited for RR at a SNF.  She was seen by DPM Dr Benavides who debrided onchymycotic nails.    86/47; HR 85; T 96.5; PO 98%  oral membranes well hydrated  no JVD; no goiter  symm BS clear  RR,Nl;S1S2  abdomen soft,NT/ND  no edema
CHRISTINE WILSON  83y  Female    Patient is a 83y old  Female who presents with a chief complaint of s/p Fall (2019 10:05)      INTERVAL HPI/OVERNIGHT EVENTS:  No interval events. Patient has no complaints except for Insomnia.      REVIEW OF SYSTEMS:  CONSTITUTIONAL: No fever, weight loss, or fatigue  EYES: No eye pain, visual disturbances, or discharge  ENMT:  No difficulty hearing, tinnitus, vertigo; No sinus or throat pain  NECK: No pain or stiffness  BREASTS: No pain, masses, or nipple discharge  RESPIRATORY: No cough, wheezing, chills or hemoptysis; No shortness of breath  CARDIOVASCULAR: No chest pain, palpitations, dizziness, or leg swelling  GASTROINTESTINAL: No abdominal or epigastric pain. No nausea, vomiting, or hematemesis; No diarrhea or constipation. No melena or hematochezia.  GENITOURINARY: No dysuria, frequency, hematuria, or incontinence  NEUROLOGICAL: No headaches, memory loss, loss of strength, numbness, or tremors  SKIN: No itching, burning, rashes, or lesions   LYMPH NODES: No enlarged glands  ENDOCRINE: No heat or cold intolerance; No hair loss  MUSCULOSKELETAL: No joint pain or swelling; No muscle, back, or extremity pain  PSYCHIATRIC: No depression, anxiety, mood swings, + difficulty sleeping  HEME/LYMPH: No easy bruising, or bleeding gums  ALLERGY AND IMMUNOLOGIC: No hives or eczema      VITALS:  T(F): 95.9 (19 @ 05:10), Max: 96.8 (19 @ 20:35)  HR: 100 (19 @ 05:10) (100 - 119)  BP: 102/57 (19 @ 05:10) (102/57 - 141/87)  RR: 16 (19 @ 05:10) (16 - 20)  SpO2: 97% (19 @ 21:50) (95% - 97%)      PHYSICAL EXAM:  GENERAL: NAD  HEAD:  Atraumatic, Normocephalic  EYES: conjunctiva and sclera clear  ENMT: Moist mucous membranes  NECK: Supple, Normal thyroid  NERVOUS SYSTEM:  Alert & Oriented X3, Good concentration; Motor Strength 5/5 B/L upper and lower extremities  CHEST/LUNG: Clear to auscultation bilaterally; No rales, rhonchi, wheezing, or rubs  HEART: Regular rate and rhythm; No murmurs, rubs, or gallops  ABDOMEN: Soft, Nontender, Nondistended; Bowel sounds present  EXTREMITIES:  2+ Peripheral Pulses, No clubbing, cyanosis, or edema  LYMPH: No lymphadenopathy noted  SKIN: No rashes or lesions    Consultant(s) Notes Reviewed:  [x ] YES  [ ] NO  Care Discussed with Consultants/Other Providers [ x] YES  [ ] NO    LABS:                        12.8   11.63 )-----------( 207      ( 2019 08:48 )             37.4       -18    148<H>  |  110  |  50<H>  ----------------------------<  109<H>  3.9   |  28  |  1.3    Ca    9.0      2019 08:48  Phos  2.5       Mg     1.9         TPro  5.6<L>  /  Alb  3.3<L>  /  TBili  0.7  /  DBili  x   /  AST  39  /  ALT  20  /  AlkPhos  29<L>        Urinalysis Basic - ( 2019 14:40 )    Color: Yellow / Appearance: Turbid / S.025 / pH: x  Gluc: x / Ketone: Negative  / Bili: Moderate / Urobili: 1.0 mg/dL   Blood: x / Protein: 30 mg/dL / Nitrite: Negative   Leuk Esterase: Small / RBC: 1-2 /HPF / WBC 6-10 /HPF   Sq Epi: x / Non Sq Epi: Few /HPF / Bacteria: Occasional /HPF      MICROBIOLOGY: None      RADIOLOGY & ADDITIONAL TESTS:  	CT Head No Cont (19 @ 14:59)   	1.  Prominent ventricles out of proportion to sulcal pattern in the   	appropriate clinical setting consistent with communicating hydrocephalus.     	2.  Periventricular and subcortical white matter chronic small vessel   	ischemic changes.    	3.  No acute mass effect, midline shift or hemorrhage.     	4.  Slightly limited study due to patient motion and motion artifact.     	5.  If the patient continues to be symptomatic follow-up MRI of the brain   	may be helpful for further evaluation.    Imaging Personally Reviewed:  [x] YES  [ ] NO      MEDICATIONS  (STANDING):  chlorhexidine 4% Liquid 1 Application(s) Topical <User Schedule>  heparin  Injectable 5000 Unit(s) SubCutaneous every 12 hours  sodium chloride 0.45%. 1000 milliLiter(s) (75 mL/Hr) IV Continuous <Continuous>    MEDICATIONS  (PRN): None      HEALTH ISSUES - PROBLEM Dx:  Hypernatremia  Asymptomatic bacteriuria  No pertinent past medical history  H/O eye surgery
CHRISTINE WILSON  83y  Female    Patient is a 83y old  Female who presents with a chief complaint of s/p Fall (2019 10:05)      INTERVAL HPI/OVERNIGHT EVENTS:  No interval events. Patient has no new complaints.      REVIEW OF SYSTEMS:  CONSTITUTIONAL: No fever, weight loss, or fatigue  EYES: No eye pain, visual disturbances, or discharge  ENMT:  No difficulty hearing, tinnitus, vertigo; No sinus or throat pain  NECK: No pain or stiffness  BREASTS: No pain, masses, or nipple discharge  RESPIRATORY: No cough, wheezing, chills or hemoptysis; No shortness of breath  CARDIOVASCULAR: No chest pain, palpitations, dizziness, or leg swelling  GASTROINTESTINAL: No abdominal or epigastric pain. No nausea, vomiting, or hematemesis; No diarrhea or constipation. No melena or hematochezia.  GENITOURINARY: No dysuria, frequency, hematuria, or incontinence  NEUROLOGICAL: No headaches, memory loss, loss of strength, numbness, or tremors  SKIN: No itching, burning, rashes, or lesions   LYMPH NODES: No enlarged glands  ENDOCRINE: No heat or cold intolerance; No hair loss  MUSCULOSKELETAL: No joint pain or swelling; No muscle, back, or extremity pain  PSYCHIATRIC: No depression, anxiety, mood swings, + difficulty sleeping  HEME/LYMPH: No easy bruising, or bleeding gums  ALLERGY AND IMMUNOLOGIC: No hives or eczema      VITALS:  T(C): 35.8 (2019 06:33), Max: 36.4 (2019 20:06)  T(F): 96.5 (2019 06:33), Max: 97.6 (2019 20:06)  HR: 85 (2019 06:33) (85 - 111)  BP: 86/47 (2019 06:33) (86/47 - 102/62)  BP(mean): --  RR: 16 (2019 06:33) (16 - 16)  SpO2: 98% (2019 07:29) (98% - 98%)      PHYSICAL EXAM:  GENERAL: NAD  HEAD:  Atraumatic, Normocephalic  EYES: conjunctiva and sclera clear  ENMT: Moist mucous membranes  NECK: Supple, Normal thyroid  NERVOUS SYSTEM:  Alert & Oriented X3, Good concentration; Motor Strength 5/5 B/L upper and lower extremities  CHEST/LUNG: Clear to auscultation bilaterally; No rales, rhonchi, wheezing, or rubs  HEART: Regular rate and rhythm; No murmurs, rubs, or gallops  ABDOMEN: Soft, Nontender, Nondistended; Bowel sounds present  EXTREMITIES:  2+ Peripheral Pulses, No clubbing, cyanosis, or edema  LYMPH: No lymphadenopathy noted  SKIN: No rashes or lesions    Consultant(s) Notes Reviewed:  [x ] YES  [ ] NO  Care Discussed with Consultants/Other Providers [ x] YES  [ ] NO    LABS:                        12.8   11.63 )-----------( 207      ( 2019 08:48 )             37.4         138  |  105  |  39<H>  ----------------------------<  86  3.9   |  26  |  1.1    Ca    8.3<L>      2019 07:41  Phos  2.5       Mg     1.9         TPro  5.6<L>  /  Alb  3.3<L>  /  TBili  0.7  /  DBili  x   /  AST  39  /  ALT  20  /  AlkPhos  29<L>          Urinalysis Basic - ( 2019 14:40 )    Color: Yellow / Appearance: Turbid / S.025 / pH: x  Gluc: x / Ketone: Negative  / Bili: Moderate / Urobili: 1.0 mg/dL   Blood: x / Protein: 30 mg/dL / Nitrite: Negative   Leuk Esterase: Small / RBC: 1-2 /HPF / WBC 6-10 /HPF   Sq Epi: x / Non Sq Epi: Few /HPF / Bacteria: Occasional /HPF      MICROBIOLOGY: None      RADIOLOGY & ADDITIONAL TESTS:  	CT Head No Cont (19 @ 14:59)   	1.  Prominent ventricles out of proportion to sulcal pattern in the   	appropriate clinical setting consistent with communicating hydrocephalus.     	2.  Periventricular and subcortical white matter chronic small vessel   	ischemic changes.    	3.  No acute mass effect, midline shift or hemorrhage.     	4.  Slightly limited study due to patient motion and motion artifact.     	5.  If the patient continues to be symptomatic follow-up MRI of the brain   	may be helpful for further evaluation.    Imaging Personally Reviewed:  [x] YES  [ ] NO      MEDICATIONS  (STANDING):  chlorhexidine 4% Liquid 1 Application(s) Topical <User Schedule>  heparin  Injectable 5000 Unit(s) SubCutaneous every 12 hours  sodium chloride 0.45%. 1000 milliLiter(s) (75 mL/Hr) IV Continuous <Continuous>    MEDICATIONS  (PRN): None      HEALTH ISSUES - PROBLEM Dx:  Hypernatremia  Asymptomatic bacteriuria  No pertinent past medical history  H/O eye surgery
CHRISTINE WILSON  83y  Female    Patient is a 83y old  Female who presents with a chief complaint of s/p Fall (2019 10:05)      INTERVAL HPI/OVERNIGHT EVENTS:  Patient hypotensive yesterday. BP stabilized.  Patient has no new complaints.      REVIEW OF SYSTEMS:  CONSTITUTIONAL: No fever, weight loss, or fatigue  EYES: No eye pain, visual disturbances, or discharge  ENMT:  No difficulty hearing, tinnitus, vertigo; No sinus or throat pain  NECK: No pain or stiffness  BREASTS: No pain, masses, or nipple discharge  RESPIRATORY: No cough, wheezing, chills or hemoptysis; No shortness of breath  CARDIOVASCULAR: No chest pain, palpitations, dizziness, or leg swelling  GASTROINTESTINAL: No abdominal or epigastric pain. No nausea, vomiting, or hematemesis; No diarrhea or constipation. No melena or hematochezia.  GENITOURINARY: No dysuria, frequency, hematuria, or incontinence  NEUROLOGICAL: No headaches, memory loss, loss of strength, numbness, or tremors  SKIN: No itching, burning, rashes, or lesions   LYMPH NODES: No enlarged glands  ENDOCRINE: No heat or cold intolerance; No hair loss  MUSCULOSKELETAL: No joint pain or swelling; No muscle, back, or extremity pain  PSYCHIATRIC: No depression, anxiety, mood swings, + difficulty sleeping  HEME/LYMPH: No easy bruising, or bleeding gums  ALLERGY AND IMMUNOLOGIC: No hives or eczema      VITALS:  T(C): 36.9 (2019 05:09), Max: 36.9 (2019 05:09)  T(F): 98.4 (2019 05:09), Max: 98.4 (2019 05:09)  HR: 79 (2019 05:09) (79 - 87)  BP: 115/54 (2019 05:09) (82/53 - 115/54)  BP(mean): --  RR: 16 (2019 05:09) (16 - 16)  SpO2: --      PHYSICAL EXAM:  GENERAL: NAD  HEAD:  Atraumatic, Normocephalic  EYES: conjunctiva and sclera clear  ENMT: Moist mucous membranes  NECK: Supple, Normal thyroid  NERVOUS SYSTEM:  Alert & Oriented X 3, Good concentration; Motor Strength 5/5 B/L upper and lower extremities  CHEST/LUNG: Clear to auscultation bilaterally; No rales, rhonchi, wheezing, or rubs  HEART: Regular rate and rhythm; No murmurs, rubs, or gallops  ABDOMEN: Soft, Nontender, Nondistended; Bowel sounds present  EXTREMITIES:  2+ Peripheral Pulses, No clubbing, cyanosis, or edema  LYMPH: No lymphadenopathy noted  SKIN: No rashes or lesions    Consultant(s) Notes Reviewed:  [x ] YES  [ ] NO  Care Discussed with Consultants/Other Providers [ x] YES  [ ] NO    LABS:                        10.4   9.88  )-----------( 156      ( 2019 09:02 )             30.2     -    132<L>  |  101  |  32<H>  ----------------------------<  97  4.1   |  25  |  1.1    Ca    8.2<L>      2019 09:02      Urinalysis Basic - ( 2019 14:40 )    Color: Yellow / Appearance: Turbid / S.025 / pH: x  Gluc: x / Ketone: Negative  / Bili: Moderate / Urobili: 1.0 mg/dL   Blood: x / Protein: 30 mg/dL / Nitrite: Negative   Leuk Esterase: Small / RBC: 1-2 /HPF / WBC 6-10 /HPF   Sq Epi: x / Non Sq Epi: Few /HPF / Bacteria: Occasional /HPF      MICROBIOLOGY:   Culture - Urine (19 @ 14:40)    -  Amikacin: S <=8    -  Amoxicillin/Clavulanic Acid: S <=8/4    -  Ampicillin: S <=2 These ampicillin results predict results for amoxicillin    -  Ampicillin/Sulbactam: S <=4/2    -  Aztreonam: S <=4    -  Cefazolin: S <=2 For uncomplicated UTI with K. pneumoniae, E. coli, or P. mirablis: BLANCA <=16 is sensitive and BLANCA >=32 is resistant. This also predicts results for oral agents cefaclor, cefdinir, cefpodoxime, cefprozil, cefuroxime axetil, cephalexin and locarbef for uncomplicated UTI. Note that some isolates may be susceptible to these agents while testing resistant to cefazolin.    -  Cefepime: S <=2    -  Cefoxitin: S <=4    -  Ceftriaxone: S <=1 Enterobacter, Citrobacter, and Serratia may develop resistance during prolonged therapy    -  Ciprofloxacin: S <=0.5    -  Ertapenem: S <=0.5    -  Gentamicin: S <=1    -  Imipenem: S <=1    -  Levofloxacin: S <=1    -  Meropenem: S <=1    -  Nitrofurantoin: S <=32 Should not be used to treat pyelonephritis    -  Piperacillin/Tazobactam: S <=8    -  Tigecycline: S <=1    -  Tobramycin: S <=2    -  Trimethoprim/Sulfamethoxazole: S <=0.5/9.5    Specimen Source: .Urine Clean Catch (Midstream)    Culture Results:   50,000 - 99,000 CFU/mL Escherichia coli    Organism Identification: Escherichia coli    Organism: Escherichia coli    Method Type: BLANCA        RADIOLOGY & ADDITIONAL TESTS:  	CT Head No Cont (19 @ 14:59)   	1.  Prominent ventricles out of proportion to sulcal pattern in the   	appropriate clinical setting consistent with communicating hydrocephalus.     	2.  Periventricular and subcortical white matter chronic small vessel   	ischemic changes.    	3.  No acute mass effect, midline shift or hemorrhage.     	4.  Slightly limited study due to patient motion and motion artifact.     	5.  If the patient continues to be symptomatic follow-up MRI of the brain   	may be helpful for further evaluation.    Imaging Personally Reviewed:  [x] YES  [ ] NO      MEDICATIONS  (STANDING):  cefTRIAXone   IVPB 1 Gram(s) IV Intermittent every 24 hours  chlorhexidine 4% Liquid 1 Application(s) Topical <User Schedule>  heparin  Injectable 5000 Unit(s) SubCutaneous every 12 hours  midodrine 5 milliGRAM(s) Oral every 8 hours    MEDICATIONS  (PRN):  acetaminophen   Tablet .. 650 milliGRAM(s) Oral every 6 hours PRN Temp greater or equal to 38C (100.4F), Moderate Pain (4 - 6)      HEALTH ISSUES - PROBLEM Dx:  Hypernatremia  Asymptomatic bacteriuria  No pertinent past medical history  H/O eye surgery
CHRISTINE WILSON  83y  Female    Patient is a 83y old  Female who presents with a chief complaint of s/p Fall (2019 10:05)      INTERVAL HPI/OVERNIGHT EVENTS:  Patient hypotensive yesterday. BP stabilized.  Patient has no new complaints.      REVIEW OF SYSTEMS:  CONSTITUTIONAL: No fever, weight loss, or fatigue  EYES: No eye pain, visual disturbances, or discharge  ENMT:  No difficulty hearing, tinnitus, vertigo; No sinus or throat pain  NECK: No pain or stiffness  RESPIRATORY: No cough, wheezing, chills or hemoptysis; No shortness of breath  CARDIOVASCULAR: No chest pain, palpitations, dizziness, or leg swelling  GASTROINTESTINAL: No abdominal or epigastric pain. No nausea, vomiting, or hematemesis; No diarrhea or constipation. No melena or hematochezia.  GENITOURINARY: No dysuria, frequency, hematuria, or incontinence  NEUROLOGICAL: No headaches, memory loss, loss of strength, numbness, or tremors  SKIN: No itching, burning, rashes, or lesions   LYMPH NODES: No enlarged glands  ENDOCRINE: No heat or cold intolerance; No hair loss  MUSCULOSKELETAL: No joint pain or swelling; No muscle, back, or extremity pain  PSYCHIATRIC: No depression, anxiety, mood swings, + difficulty sleeping  HEME/LYMPH: No easy bruising, or bleeding gums  ALLERGY AND IMMUNOLOGIC: No hives or eczema      VITALS:  Vital Signs Last 24 Hrs  T(C): 37.2 (2019 05:09), Max: 37.4 (2019 22:00)  T(F): 98.9 (2019 05:09), Max: 99.3 (2019 22:00)  HR: 103 (2019 05:09) (58 - 103)  BP: 107/58 (2019 05:09) (107/58 - 122/58)  BP(mean): --  RR: 18 (2019 05:09) (16 - 18)  SpO2: --      PHYSICAL EXAM:  GENERAL: NAD  HEAD:  Atraumatic, Normocephalic  EYES: conjunctiva and sclera clear  ENMT: Moist mucous membranes  NECK: Supple, Normal thyroid  NERVOUS SYSTEM:  Awake and alert, Good concentration; Motor Strength 5/5 B/L upper and lower extremities  CHEST/LUNG: Clear to auscultation bilaterally; No rales, rhonchi, wheezing, or rubs  HEART: Regular rate and rhythm; No murmurs, rubs, or gallops  ABDOMEN: Soft, Nontender, Nondistended; Bowel sounds present  EXTREMITIES:  2+ Peripheral Pulses, No clubbing, cyanosis, or edema  LYMPH: No lymphadenopathy noted  SKIN: No rashes or lesions    Consultant(s) Notes Reviewed:  [x ] YES  [ ] NO  Care Discussed with Consultants/Other Providers [ x] YES  [ ] NO    LABS:                 137  |  98  |  29<H>  ----------------------------<  104<H>  4.6   |  29  |  1.0    Ca    8.7      2019 11:36          Urinalysis Basic - ( 2019 14:40 )    Color: Yellow / Appearance: Turbid / S.025 / pH: x  Gluc: x / Ketone: Negative  / Bili: Moderate / Urobili: 1.0 mg/dL   Blood: x / Protein: 30 mg/dL / Nitrite: Negative   Leuk Esterase: Small / RBC: 1-2 /HPF / WBC 6-10 /HPF   Sq Epi: x / Non Sq Epi: Few /HPF / Bacteria: Occasional /HPF      MICROBIOLOGY:   Culture - Urine (19 @ 14:40)    -  Amikacin: S <=8    -  Amoxicillin/Clavulanic Acid: S <=8/4    -  Ampicillin: S <=2 These ampicillin results predict results for amoxicillin    -  Ampicillin/Sulbactam: S <=4/2    -  Aztreonam: S <=4    -  Cefazolin: S <=2 For uncomplicated UTI with K. pneumoniae, E. coli, or P. mirablis: BLANCA <=16 is sensitive and BLANCA >=32 is resistant. This also predicts results for oral agents cefaclor, cefdinir, cefpodoxime, cefprozil, cefuroxime axetil, cephalexin and locarbef for uncomplicated UTI. Note that some isolates may be susceptible to these agents while testing resistant to cefazolin.    -  Cefepime: S <=2    -  Cefoxitin: S <=4    -  Ceftriaxone: S <=1 Enterobacter, Citrobacter, and Serratia may develop resistance during prolonged therapy    -  Ciprofloxacin: S <=0.5    -  Ertapenem: S <=0.5    -  Gentamicin: S <=1    -  Imipenem: S <=1    -  Levofloxacin: S <=1    -  Meropenem: S <=1    -  Nitrofurantoin: S <=32 Should not be used to treat pyelonephritis    -  Piperacillin/Tazobactam: S <=8    -  Tigecycline: S <=1    -  Tobramycin: S <=2    -  Trimethoprim/Sulfamethoxazole: S <=0.5/9.5    Specimen Source: .Urine Clean Catch (Midstream)    Culture Results:   50,000 - 99,000 CFU/mL Escherichia coli    Organism Identification: Escherichia coli    Organism: Escherichia coli    Method Type: [facility]        RADIOLOGY & ADDITIONAL TESTS:  	CT Head No Cont (19 @ 14:59)   	1.  Prominent ventricles out of proportion to sulcal pattern in the   	appropriate clinical setting consistent with communicating hydrocephalus.     	2.  Periventricular and subcortical white matter chronic small vessel   	ischemic changes.    	3.  No acute mass effect, midline shift or hemorrhage.     	4.  Slightly limited study due to patient motion and motion artifact.     	5.  If the patient continues to be symptomatic follow-up MRI of the brain   	may be helpful for further evaluation.    Imaging Personally Reviewed:  [x] YES  [ ] NO      MEDICATIONS  (STANDING):  MEDICATIONS  (STANDING):  cefTRIAXone   IVPB      cefTRIAXone   IVPB 1 Gram(s) IV Intermittent every 24 hours  chlorhexidine 4% Liquid 1 Application(s) Topical <User Schedule>  heparin  Injectable 5000 Unit(s) SubCutaneous every 12 hours  midodrine 5 milliGRAM(s) Oral every 8 hours    MEDICATIONS  (PRN):  acetaminophen   Tablet .. 650 milliGRAM(s) Oral every 6 hours PRN Temp greater or equal to 38C (100.4F), Moderate Pain (4 - 6)        HEALTH ISSUES - PROBLEM Dx:  Hypernatremia  Asymptomatic bacteriuria  No pertinent past medical history  H/O eye surgery
Chief Complaint:  Patient is a 83y old  Female who presents with a chief complaint of s/p Fall (2019 11:01)      Interval Events:     Allergies:  No Known Allergies      Home Medications:    Hospital Medications:  acetaminophen   Tablet .. 650 milliGRAM(s) Oral every 6 hours PRN  cefTRIAXone   IVPB      cefTRIAXone   IVPB 1 Gram(s) IV Intermittent every 24 hours  chlorhexidine 4% Liquid 1 Application(s) Topical <User Schedule>  heparin  Injectable 5000 Unit(s) SubCutaneous every 12 hours  midodrine 5 milliGRAM(s) Oral every 8 hours      PMHX/PSHX:  No pertinent past medical history  H/O eye surgery      Family history:  Family history of heart disease (Sibling)  No pertinent family history in first degree relatives      ROS:   As mentioned below      PHYSICAL EXAM:   Vital Signs:  Vital Signs Last 24 Hrs  T(C): 35.9 (2019 14:35), Max: 38.6 (2019 22:41)  T(F): 96.6 (2019 14:35), Max: 101.5 (2019 22:41)  HR: 95 (2019 14:35) (88 - 100)  BP: 109/55 (2019 14:35) (109/55 - 116/60)  BP(mean): --  RR: 16 (2019 14:35) (16 - 16)  SpO2: --  Daily     Daily Weight in k.2 (2019 06:15)    GENERAL:  NAD  HEENT:  NC/AT,  No Thyromegaly  CHEST:  CTA B/L  HEART:  S1, S2- No M, R, G  ABDOMEN:  Soft, NT, ND  EXTEREMITIES:  no cyanosis,clubbing or edema  SKIN:  NAD  NEURO:  Grossly Nr.    LABS:                        10.4   9.88  )-----------( 156      ( 2019 09:02 )             30.2     01-20    132<L>  |  101  |  32<H>  ----------------------------<  97  4.1   |  25  |  1.1    Ca    8.2<L>      2019 09:02                Imaging:
SUBJECTIVE:    Patient is a 83y old Female who presents with a chief complaint of s/p Fall (21 Jan 2019 15:53)    Currently admitted to medicine with the primary diagnosis of Hypernatremia     Today is hospital day 5d.    PAST MEDICAL & SURGICAL HISTORY  No pertinent past medical history  H/O eye surgery    SOCIAL HISTORY:  Negative for smoking/alcohol/drug use.     ALLERGIES:  No Known Allergies    MEDICATIONS:  STANDING MEDICATIONS  cefTRIAXone   IVPB      cefTRIAXone   IVPB 1 Gram(s) IV Intermittent every 24 hours  chlorhexidine 4% Liquid 1 Application(s) Topical <User Schedule>  heparin  Injectable 5000 Unit(s) SubCutaneous every 12 hours  midodrine 5 milliGRAM(s) Oral every 8 hours    PRN MEDICATIONS  acetaminophen   Tablet .. 650 milliGRAM(s) Oral every 6 hours PRN    VITALS:   T(F): 98.9  HR: 103  BP: 107/58  RR: 18  SpO2: --    RADIOLOGY:    < from: CT Head No Cont (01.17.19 @ 14:59) >    1.  Prominent ventricles out of proportion to sulcal pattern in the   appropriate clinical setting consistent with communicating hydrocephalus.     2.  Periventricular and subcortical white matter chronic small vessel   ischemic changes.    3.  No acute mass effect, midline shift or hemorrhage.     4.  Slightly limited study due to patient motion and motion artifact.     5.  If the patient continues to be symptomatic follow-up MRI of the brain   may be helpful for further evaluation.      < end of copied text >    PHYSICAL EXAM:  GEN: No acute distress  LUNGS: Clear to auscultation bilaterally   HEART: S1/S2 present. RRR.   ABD: Soft, non-tender, non-distended. Bowel sounds present  EXT: NC/NC/NE/2+PP/WHITEHEAD  NEURO: [id number]
no

## 2023-09-07 NOTE — ED PROVIDER NOTE - MUSCULOSKELETAL, MLM
complains of pain/discomfort
Spine appears normal, range of motion is not limited, no muscle or joint tenderness

## 2025-02-12 NOTE — PATIENT PROFILE ADULT - ABILITY TO HEAR (WITH HEARING AID OR HEARING APPLIANCE IF NORMALLY USED):
Printed and gave paperwork to Dr. Genao form completed and sent.    Adequate: hears normal conversation without difficulty

## 2025-07-01 NOTE — ED PROVIDER NOTE - NSTIMEPROVIDERCAREINITIATE_GEN_ER
Take medications only as directed.  Take tamsulosin every night.  Follow-up with urology.  Return if acutely worsening worrisome symptoms.   17-May-2022 22:34